# Patient Record
Sex: FEMALE | Race: WHITE | Employment: OTHER | URBAN - METROPOLITAN AREA
[De-identification: names, ages, dates, MRNs, and addresses within clinical notes are randomized per-mention and may not be internally consistent; named-entity substitution may affect disease eponyms.]

---

## 2018-12-21 ENCOUNTER — HOSPITAL ENCOUNTER (OUTPATIENT)
Dept: MAMMOGRAPHY | Age: 63
Discharge: HOME | End: 2018-12-21
Payer: COMMERCIAL

## 2018-12-21 DIAGNOSIS — Z12.39 BREAST CANCER SCREENING: ICD-10-CM

## 2018-12-21 PROCEDURE — 77063 BREAST TOMOSYNTHESIS BI: CPT

## 2021-09-20 ENCOUNTER — HOSPITAL ENCOUNTER (OUTPATIENT)
Dept: MAMMOGRAPHY | Age: 66
Discharge: HOME | End: 2021-09-20
Payer: COMMERCIAL

## 2021-09-20 DIAGNOSIS — Z12.31 BREAST CANCER SCREENING BY MAMMOGRAM: ICD-10-CM

## 2021-09-20 PROCEDURE — 77063 BREAST TOMOSYNTHESIS BI: CPT

## 2021-12-12 ENCOUNTER — APPOINTMENT (OUTPATIENT)
Dept: GENERAL RADIOLOGY | Age: 66
End: 2021-12-12
Payer: COMMERCIAL

## 2021-12-12 ENCOUNTER — HOSPITAL ENCOUNTER (EMERGENCY)
Age: 66
Discharge: HOME | End: 2021-12-12
Attending: STUDENT IN AN ORGANIZED HEALTH CARE EDUCATION/TRAINING PROGRAM
Payer: COMMERCIAL

## 2021-12-12 VITALS
HEIGHT: 59 IN | TEMPERATURE: 98.5 F | HEART RATE: 81 BPM | WEIGHT: 158 LBS | DIASTOLIC BLOOD PRESSURE: 91 MMHG | RESPIRATION RATE: 16 BRPM | BODY MASS INDEX: 31.85 KG/M2 | SYSTOLIC BLOOD PRESSURE: 144 MMHG | OXYGEN SATURATION: 98 %

## 2021-12-12 DIAGNOSIS — S52.502A CLOSED FRACTURE OF DISTAL END OF LEFT RADIUS, UNSPECIFIED FRACTURE MORPHOLOGY, INITIAL ENCOUNTER: Primary | ICD-10-CM

## 2021-12-12 PROCEDURE — 29125 APPL SHORT ARM SPLINT STATIC: CPT

## 2021-12-12 PROCEDURE — 6370000000 HC RX 637 (ALT 250 FOR IP): Performed by: STUDENT IN AN ORGANIZED HEALTH CARE EDUCATION/TRAINING PROGRAM

## 2021-12-12 PROCEDURE — 73110 X-RAY EXAM OF WRIST: CPT

## 2021-12-12 PROCEDURE — 99283 EMERGENCY DEPT VISIT LOW MDM: CPT

## 2021-12-12 RX ORDER — ACETAMINOPHEN 325 MG/1
650 TABLET ORAL ONCE
Status: COMPLETED | OUTPATIENT
Start: 2021-12-12 | End: 2021-12-12

## 2021-12-12 RX ORDER — HYDROCODONE BITARTRATE AND ACETAMINOPHEN 5; 325 MG/1; MG/1
1 TABLET ORAL EVERY 8 HOURS PRN
Qty: 9 TABLET | Refills: 0 | Status: SHIPPED | OUTPATIENT
Start: 2021-12-12 | End: 2021-12-15

## 2021-12-12 RX ADMIN — ACETAMINOPHEN 650 MG: 325 TABLET ORAL at 17:38

## 2021-12-12 ASSESSMENT — PAIN DESCRIPTION - ONSET: ONSET: GRADUAL

## 2021-12-12 ASSESSMENT — PAIN DESCRIPTION - ORIENTATION: ORIENTATION: LEFT

## 2021-12-12 ASSESSMENT — PAIN DESCRIPTION - DESCRIPTORS: DESCRIPTORS: ACHING

## 2021-12-12 ASSESSMENT — PAIN DESCRIPTION - PAIN TYPE: TYPE: ACUTE PAIN

## 2021-12-12 ASSESSMENT — PAIN SCALES - GENERAL: PAINLEVEL_OUTOF10: 4

## 2021-12-12 ASSESSMENT — ENCOUNTER SYMPTOMS
GASTROINTESTINAL NEGATIVE: 1
BACK PAIN: 0

## 2021-12-12 ASSESSMENT — PAIN DESCRIPTION - FREQUENCY: FREQUENCY: CONTINUOUS

## 2021-12-12 ASSESSMENT — PAIN DESCRIPTION - LOCATION: LOCATION: WRIST

## 2021-12-12 NOTE — LETTER
[facility] [place] [facility]  [address] [facility] [address]  Phone: [phone]               December 12, 2021    Patient: Santi Whiteside   YOB: 1955   Date of Visit: 12/12/2021       To Whom It May Concern:    Ulysses Gutta was seen and treated in our emergency department on 12/12/2021. She may return to work on 12/15/2021, or once cleared by orthopedics.        Sincerely,       Raman Garcia RN         Signature:__________________________________

## 2021-12-12 NOTE — ED PROVIDER NOTES
[address] [facility]  EMERGENCY DEPARTMENT ENCOUNTER      Pt Name: Sheridan Monterroso  MRN: [id number]  [place] 1955  Date of evaluation: 12/12/2021  Provider: Anne Dudley DO    CHIEF COMPLAINT       Chief Complaint   Patient presents with    Arm Injury     pt states she fell, has left wrist pain         HISTORY OF PRESENT ILLNESS   (Location/Symptom, Timing/Onset, Context/Setting, Quality, Duration, Modifying Factors, Severity)  Note limiting factors. Sheridan Monterroso is a 61 y.o. female who presents to the emergency department complaining of left wrist pain, swelling. Patient reports that earlier this afternoon she slipped on a deck fell down her left side onto left upper extremity. Complaining of pain with mild swelling over the distal radius. Slight tingling sensation into the volar digits. Reports that she still has intact sensation. Normal range of motion to wrist, elbow, shoulder. Did not hit head did not lose consciousness. Denies other musculoskeletal complaint        Nursing Notes were reviewed. PAST MEDICAL HISTORY     Past Medical History:   Diagnosis Date    Thyroid disease          SURGICAL HISTORY       Past Surgical History:   Procedure Laterality Date    BREAST REDUCTION SURGERY      BREAST SURGERY      CHOLECYSTECTOMY           CURRENT MEDICATIONS       Previous Medications    LEVOTHYROXINE (SYNTHROID) 75 MCG TABLET    Take 75 mcg by mouth Daily. OMEGA-3 FATTY ACIDS (FISH OIL) 1000 MG CPDR    Take 1 capsule by mouth daily.        ALLERGIES     Codeine    FAMILY HISTORY       Family History   Problem Relation Age of Onset    Kidney Disease Mother     Heart Disease Mother         mi    Heart Disease Father 79        Triple bypass          SOCIAL HISTORY       Social History     Socioeconomic History    Marital status:      Spouse name: None    Number of children: None    Years of education: None    Highest education level: None   Occupational History    None   Tobacco Use    Smoking status: Former Smoker     Packs/day: 1.00     Years: 23.00     Pack years: 23.00     Quit date: 10/23/2003     Years since quittin.1    Smokeless tobacco: None   Substance and Sexual Activity    Alcohol use: No    Drug use: No    Sexual activity: None   Other Topics Concern    None   Social History Narrative    None     Social Determinants of Health     Financial Resource Strain:     Difficulty of Paying Living Expenses: Not on file   Food Insecurity:     Worried About Running Out of Food in the Last Year: Not on file    Valery of Food in the Last Year: Not on file   Transportation Needs:     Lack of Transportation (Medical): Not on file    Lack of Transportation (Non-Medical): Not on file   Physical Activity:     Days of Exercise per Week: Not on file    Minutes of Exercise per Session: Not on file   Stress:     Feeling of Stress : Not on file   Social Connections:     Frequency of Communication with Friends and Family: Not on file    Frequency of Social Gatherings with Friends and Family: Not on file    Attends Druze Services: Not on file    Active Member of [address] [place] [address] or Organizations: Not on file    Attends Club or Organization Meetings: Not on file    Marital Status: Not on file   Intimate Partner Violence:     Fear of Current or Ex-Partner: Not on file    Emotionally Abused: Not on file    Physically Abused: Not on file    Sexually Abused: Not on file   Housing Stability:     Unable to Pay for Housing in the Last Year: Not on file    Number of [place] in the Last Year: Not on file    Unstable Housing in the Last Year: Not on file       SCREENINGS                            REVIEW OF SYSTEMS    (2-9 systems for level 4, 10 or more for level 5)   Review of Systems   Constitutional: Negative. HENT: Negative. Cardiovascular: Negative. Gastrointestinal: Negative. Musculoskeletal: Positive for arthralgias and joint swelling. Negative for back pain and neck pain. Skin: Negative for wound. Neurological: Negative for headaches. PHYSICAL EXAM    (up to 7 for level 4, 8 or more for level 5)   RECENT VITALS:     Temp: 98.5 °F (36.9 °C),  Pulse: 98, Resp: 14, BP: (!) 169/98, SpO2: 99 %    Physical Exam  HENT:      Head: Normocephalic and atraumatic. Eyes:      Pupils: Pupils are equal, round, and reactive to light. Neck:      Comments: No midline pain no step-offs no deformities  Pulmonary:      Effort: Pulmonary effort is normal. No respiratory distress. Musculoskeletal:      Cervical back: Normal range of motion and neck supple. Comments: Left upper extremity no tenderness to palpation at shoulder or elbow. Patient has a tenderness over the distal radius with mild swelling. Normal range of motion the wrist.  Intact sensation distally. Cap refill less than 2 seconds. Neurological:      Mental Status: She is alert. DIAGNOSTIC RESULTS     EKG: All EKG's are interpreted by the Emergency Department Physician who either signs or Co-signs this chart in the absence of a cardiologist.          RADIOLOGY:   Non-plain film images such as CT, Ultrasound and MRI are read by the radiologist. Plain radiographic images are visualized and preliminarily interpreted by the emergency physician. Interpretation per the Radiologist below, if available at the time of this note:    XR WRIST LEFT (MIN 3 VIEWS)   Final Result   Acute minimally displaced and impacted distal radial metaphyseal fracture. Soft tissue swelling. LABS:  Labs Reviewed - No data to display    All other labs were within normal range or not returned as of this dictation. EMERGENCY DEPARTMENT COURSE and DIFFERENTIAL DIAGNOSIS/MDM:   Carlitos Lopez is a 61 y.o. female who presents to the emergency department with the complaint of left wrist pain, swelling after slip and fall injury fell down on the left side.   No other injuries aside from distal forearm/wrist pain. Reports intact sensation, cap refill less than 2 seconds. Concern for distal radial fracture. Obtain plain film imaging of left wrist to further evaluate the osseous. At this time pain is well controlled for patient. No other injuries noted on physical exam.    Distal radius fracture, minimally displaced, placed in sugar tong splint, outpatient orthopedic surgery follow-up. CRITICAL CARE TIME   Total Critical Care time was 0 minutes, excluding separately reportable procedures. There was a high probability of clinically significant/life threatening deterioration in the patient's condition which required my urgent intervention. Clinical concern   Intervention     CONSULTS:  None    PROCEDURES:  Unless otherwise noted below, none     Splint Application    Date/Time: 12/12/2021 4:19 PM  Performed by: Anne Dudley DO  Authorized by: Anne Dudley DO     Consent:     Consent obtained:  Verbal    Consent given by:  Patient    Risks discussed:  Discoloration and numbness    Alternatives discussed:  No treatment  Pre-procedure details:     Sensation:  Normal    Skin color:  Pink  Procedure details:     Laterality:  Left    Location:  Arm    Splint type:  Sugar tong    Supplies:  Ortho-Glass  Post-procedure details:     Pain:  Unchanged    Sensation:  Normal    Skin color:  Pink    Patient tolerance of procedure: Tolerated well, no immediate complications            FINAL IMPRESSION      1.  Closed fracture of distal end of left radius, unspecified fracture morphology, initial encounter          DISPOSITION/PLAN   DISPOSITION Discharge - Pending Orders Complete 12/12/2021 04:17:31 PM      PATIENT REFERRED TO:  Mari Mai MD  [address] [facility] [address] [facility] [address]  [phone]    Schedule an appointment as soon as possible for a visit in 1 day      [facility]  [address] [facility]  [phone]    If symptoms worsen      DISCHARGE MEDICATIONS:  New Prescriptions    No medications on file     Controlled Substances Monitoring:     No flowsheet data found.     (Please note that portions of this note were completed with a voice recognition program.  Efforts were made to edit the dictations but occasionally words are mis-transcribed.)    Bushra Catherine DO (electronically signed)  Attending Emergency Physician            Bushra Catherine DO  12/12/21 [id number]

## 2021-12-12 NOTE — ED NOTES
Splint applied to LUE by TANIA Singh DO. Sling also applied. Discharge instructions given, pt verbalized understanding. Discussed follow-up appointments and medications. No questions or concerns at this time. Pt ambulated independently out of ER. Pt discharged with 1 prescription.       Bennie Cotto RN  12/12/21 [id number]

## 2021-12-14 ENCOUNTER — OFFICE VISIT (OUTPATIENT)
Dept: ORTHOPEDIC SURGERY | Age: 66
End: 2021-12-14
Payer: COMMERCIAL

## 2021-12-14 VITALS — WEIGHT: 158 LBS | HEIGHT: 59 IN | BODY MASS INDEX: 31.85 KG/M2

## 2021-12-14 DIAGNOSIS — M25.532 LEFT WRIST PAIN: ICD-10-CM

## 2021-12-14 DIAGNOSIS — S52.532A CLOSED COLLES' FRACTURE OF LEFT RADIUS, INITIAL ENCOUNTER: Primary | ICD-10-CM

## 2021-12-14 PROCEDURE — 99203 OFFICE O/P NEW LOW 30 MIN: CPT | Performed by: ORTHOPAEDIC SURGERY

## 2021-12-14 NOTE — PROGRESS NOTES
ORTHOPAEDIC SURGERY INITIAL EVALUATION NOTE  Chief Complaint   Patient presents with    New Patient     12/12 fell through a rotted board on her porch       HISTORY OF PRESENT ILLNESS:  79-year-old right-hand-dominant female presents for evaluation of a left wrist injury. She sustained an injury when she fell through a rotten board on her porch. She fell onto an outstretched left hand. Her date of injury was 12/12/2021. This is been 2 days. Her pain level is 4 out of 10. Its localized to the wrist. It does not radiate. She denies dysesthesias. She denies any problems with her wrist in the past. She is a manual . Her pain is mostly about the dorsal aspect of the wrist. She has noticed some swelling as well as bruising. She presented the emergency department where x-rays demonstrated a distal radius fracture. She was secured into a well-padded sugar tong splint. Past Medical History:   Diagnosis Date    Thyroid disease        Current Outpatient Medications   Medication Sig Dispense Refill    HYDROcodone-acetaminophen (NORCO) 5-325 MG per tablet Take 1 tablet by mouth every 8 hours as needed for Pain for up to 3 days. Intended supply: 3 days. Take lowest dose possible to manage pain 9 tablet 0    levothyroxine (SYNTHROID) 75 MCG tablet Take 75 mcg by mouth Daily.  Omega-3 Fatty Acids (FISH OIL) 1000 MG CPDR Take 1 capsule by mouth daily. No current facility-administered medications for this visit. Past Surgical History:   Procedure Laterality Date    BREAST REDUCTION SURGERY      BREAST SURGERY      CHOLECYSTECTOMY         Allergies   Allergen Reactions    Codeine      \"doesn't like the way it makes me feel. \"       Family History   Problem Relation Age of Onset    Kidney Disease Mother     Heart Disease Mother         mi    Heart Disease Father 79        Triple bypass       Social History     Socioeconomic History    Marital status:      Spouse name: Not on file    Number of children: Not on file    Years of education: Not on file    Highest education level: Not on file   Occupational History    Not on file   Tobacco Use    Smoking status: Former Smoker     Packs/day: 1.00     Years: 23.00     Pack years: 23.00     Quit date: 10/23/2003     Years since quittin.1    Smokeless tobacco: Never Used   Substance and Sexual Activity    Alcohol use: No    Drug use: No    Sexual activity: Not on file   Other Topics Concern    Not on file   Social History Narrative    Not on file     Social Determinants of Health     Financial Resource Strain:     Difficulty of Paying Living Expenses: Not on file   Food Insecurity:     Worried About Running Out of Food in the Last Year: Not on file    Valery of Food in the Last Year: Not on file   Transportation Needs:     Lack of Transportation (Medical): Not on file    Lack of Transportation (Non-Medical): Not on file   Physical Activity:     Days of Exercise per Week: Not on file    Minutes of Exercise per Session: Not on file   Stress:     Feeling of Stress : Not on file   Social Connections:     Frequency of Communication with Friends and Family: Not on file    Frequency of Social Gatherings with Friends and Family: Not on file    Attends Amish Services: Not on file    Active Member of [address] [place] [address] [employer] or Organizations: Not on file    Attends Club or Organization Meetings: Not on file    Marital Status: Not on file   Intimate Partner Violence:     Fear of Current or Ex-Partner: Not on file    Emotionally Abused: Not on file    Physically Abused: Not on file    Sexually Abused: Not on file   Housing Stability:     Unable to Pay for Housing in the Last Year: Not on file    Number of [place] in the Last Year: Not on file    Unstable Housing in the Last Year: Not on file     Review of systems: Please see intake form for today's date for complete review of systems.     PHYSICAL EXAM  Gen: awake, alert, oriented  HEENT: atraumatic, normocephalic  Neck: supple  Resp: equal chest rise bilaterally, no audible wheezes, no accessory muscle use. CV: Lower extremities warm and well perfused. Abd: soft, nontender   Focused examination of the left upper extremity:  Patient presents in a well-padded sugar tong splint. This was removed in order to examine the skin. There are no cuts, open wounds, or abrasions. There is no obvious deformity. There is mild swelling in the area. There is ecchymosis about the volar wrist. There is tenderness palpation about the dorsal wrist. Wrist range of motion was not assessed today. Finger movements are intact. There is a palpable radial pulse and brisk capillary refill to the fingers. Compartments are soft and compressible. RADIOGRAPHIC EXAM:  3 views of the left wrist including PA, oblique, and lateral projections demonstrate distal radius fracture involving primarily the dorsal cortex. There is 5 degrees dorsal angulation as a result. There is chronic deformity involving the distal radius. There is 4 mm of ulnar positive variance. There is sclerosis about the distal ulna and a matching sclerotic area in the lunate. There is a significant increase in radial inclination consistent with possible Madelung deformity. Overall alignment is unchanged from x-rays in the emergency department. ASSESSEMENT AND PLAN    61 y.o. female with the following orthopaedic surgery problems:    1. Left distal radius fracture    I reviewed the x-rays in detail with the patient. 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 She denies significant left wrist problems in the past. My inclination would be to manage this nonoperatively, however would like a CT scan to evaluate the fracture pattern and its displacement. I did place her into a well-padded short arm cast today. The stockinette was applied and then secondarily the web roll with the wrist in neutral position.  This was overwrapped with fiberglass casting material once again with the wrist in neutral position. She tolerated this well. The cast was molded to her hand. She was given generalized cast care. She will use ice and elevation to limit swelling. She will remain nonweightbearing to her left upper extremity. We will call to  her regarding the results of the CT scan. Likely, she will need 4 weeks of cast immobilization and then transition to a removable brace.     Crystal Briscoe MD

## 2021-12-15 ENCOUNTER — TELEPHONE (OUTPATIENT)
Dept: ORTHOPEDIC SURGERY | Age: 66
End: 2021-12-15

## 2021-12-17 ENCOUNTER — TELEPHONE (OUTPATIENT)
Dept: ORTHOPEDIC SURGERY | Age: 66
End: 2021-12-17

## 2021-12-29 ENCOUNTER — HOSPITAL ENCOUNTER (OUTPATIENT)
Dept: CT IMAGING | Age: 66
Discharge: HOME | End: 2021-12-29
Payer: COMMERCIAL

## 2021-12-29 DIAGNOSIS — M25.532 LEFT WRIST PAIN: ICD-10-CM

## 2021-12-29 PROCEDURE — 73200 CT UPPER EXTREMITY W/O DYE: CPT

## 2022-01-12 ENCOUNTER — OFFICE VISIT (OUTPATIENT)
Dept: ORTHOPEDIC SURGERY | Age: 67
End: 2022-01-12
Payer: COMMERCIAL

## 2022-01-12 ENCOUNTER — TELEPHONE (OUTPATIENT)
Dept: ORTHOPEDIC SURGERY | Age: 67
End: 2022-01-12

## 2022-01-12 VITALS — HEIGHT: 59 IN | BODY MASS INDEX: 31.85 KG/M2 | WEIGHT: 158 LBS

## 2022-01-12 DIAGNOSIS — R52 PAIN: ICD-10-CM

## 2022-01-12 DIAGNOSIS — S52.532D CLOSED COLLES' FRACTURE OF LEFT RADIUS WITH ROUTINE HEALING, SUBSEQUENT ENCOUNTER: Primary | ICD-10-CM

## 2022-01-12 PROCEDURE — L3908 WHO COCK-UP NONMOLDE PRE OTS: HCPCS | Performed by: ORTHOPAEDIC SURGERY

## 2022-01-12 PROCEDURE — 99213 OFFICE O/P EST LOW 20 MIN: CPT | Performed by: ORTHOPAEDIC SURGERY

## 2022-01-12 NOTE — PROGRESS NOTES
ORTHOPAEDIC SURGERY FOLLOWUP VISIT    CHIEF COMPLAINT:  Left wrist injury    DATE OF INJURY: 12/12/2021  DIAGNOSIS: Left distal radius fracture  DATE OF SURGERY: N/A    HISTORY OF PRESENT ILLNESS:  57-year-old right-hand-dominant female presents for repeat evaluation of left wrist fracture. It has now been 1 month since her injury. Her x-rays demonstrated a fracture of the distal radius, extra-articular with slight dorsal angulation and minor shortening. She was noted to have significant ulnar positive variance with ulnar impaction syndrome pre-existing to her fracture. CT scan was obtained to evaluate the anatomy and degree of angulation/shortening. She opted for nonoperative treatment. It has been 4 weeks since her injury. Overall she has noticed some intermittent numbness in her palm which seems to be positional.  She has not had any wounds or skin issues as a result of cast immobilization. She states that her pain is a 4 to a 6 out of 10 on a day-to-day basis. PHYSICAL EXAM:  General: Well-appearing female stated age. No acute distress. Left upper extremity: The patient presents in a well-padded short arm cast.  This was removed in order to examine the skin. There are no cuts, open wounds, or abrasions. There is resolving ecchymosis over the ulnar aspect of the forearm. There is no significant ecchymosis about the wrist.  There is moderate swelling primarily about the dorsal hand and distal radius. Wrist range of motion demonstrates soreness with any movement. She has 40 degrees supination, 90 degrees pronation. This is within the limits of pain. She does have tenderness to palpation over the distal ulna. She has minimal movement to radial/ulnar deviation. She has pain-free movement of the elbow. Sensation is intact to light touch in median, radial, ulnar, and axillary distributions. Motor function is intact to AIN, PIN, ulnar motor nerves.   There is a strong palpable radial pulse, and brisk capillary refill to the fingers. Compartments are soft and compressible    RADIOGRAPHIC EXAM:  CT scan of the left wrist was reviewed. This demonstrates fracture involving the distal radius. There is minor widening of the scapholunate interval, which is felt to be chronic in nature. There is ulnar positive variance, which is felt in part to be pre-existing. There is sclerosis at the distal ulna and the proximal pole of the lunate likely related to longstanding ulnar impaction syndrome. There is approximately 4 mm of impaction. There is minor apex volar angulation. 3 views of the left wrist obtained in clinic today demonstrate significant increase in distal radius inclination angle. The articulation between the distal ulna and distal radius appears to be congruent. There is sclerosis at the ulnar head and matching sclerotic area in the lunate consistent with longstanding ulnar abutment. There is approximately 10 degrees of dorsal angulation. There is evidence of ongoing healing within the distal radius metaphysis and consolidation of the dorsal comminution. ASSESSMENT AND PLAN:  1. Left distal radius fracture  2. Left wrist ulnar impaction syndrome  3. 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 She does appear to have appropriate healing in a reasonable position given her anatomic abnormality. I recommended that she continue to proceed with nonoperative treatment. She was transition to a removable wrist brace today. She will remain nonweightbearing to the left upper extremity. She can come out of the brace for wrist range of motion exercises as tolerated. She was given a referral to occupational therapy to initiate range of motion exercises. At this time, she will remain off work due to the nature of her job. She will return in 4 weeks for repeat x-rays and clinical assessment.   I did review her x-rays that potentially if she had significant ulnar-sided wrist pain after her radius was healed, she could undergo an ulnar shortening osteotomy or Darrach as a salvage procedure. She did not have significant ulnar-sided wrist pain prior to her injury, which may indicate that she will tolerate nonsurgical treatment well. We will see her back in 4 weeks.       Sunita Baker MD

## 2022-01-12 NOTE — TELEPHONE ENCOUNTER
Faxed 1/12/2022 office note to Jemima Bashir with [address] @ [phone]    Claim # [id number]

## 2022-01-13 ENCOUNTER — TELEPHONE (OUTPATIENT)
Dept: ORTHOPEDIC SURGERY | Age: 67
End: 2022-01-13

## 2022-01-18 ENCOUNTER — HOSPITAL ENCOUNTER (OUTPATIENT)
Dept: OCCUPATIONAL THERAPY | Age: 67
Setting detail: THERAPIES SERIES
Discharge: HOME | End: 2022-01-18
Payer: COMMERCIAL

## 2022-01-18 PROCEDURE — 97112 NEUROMUSCULAR REEDUCATION: CPT | Performed by: OCCUPATIONAL THERAPIST

## 2022-01-18 PROCEDURE — 97165 OT EVAL LOW COMPLEX 30 MIN: CPT | Performed by: OCCUPATIONAL THERAPIST

## 2022-01-18 NOTE — PLAN OF CARE
[address] [place], [address] [place] [address] [address]  Phone: [phone] Fax: [phone]     Occupational Therapy/Hand Therapy Certification      Dear  Dr Marcell Fernandez    We had the pleasure of evaluating the following patient for occupational therapy services at [address] [place] [address]. A summary of our findings can be found in the initial assessment below. This includes our plan of care. If you have any questions or concerns regarding these findings, please do not hesitate to contact me at the office phone number checked above. Thank you for the referral.     Physician Signature:_______________________________Date:__________________  By signing above (or electronic signature), therapists plan is approved by physician      Patient: Sara Olson   :    MRN: [id number]  Referring Physician:        Evaluation Date: 2022      Medical Diagnosis Information: A51.750I (ICD-10-CM) - Closed Colles' fracture of left radius     Treatment Diagnosis: L wrist pain M25.532           Date of Injury: 23  Date of Surgery: N/A                                  Insurance information:  Aetna    Date of Patient follow up with Physician: 4 wks    [x] Patient reported history, allergies, and medications reviewed - see intake form. Preferred Language for Healthcare:   [x]English       []other:       RESTRICTIONS/PRECAUTIONS: 5# wt limit    Latex Allergy:  [x]No      []Yes  Pacemaker:  [x] No       [] Yes       Functional Scale: 66% (Quick DASH)   Date assessed:  2022      C-SSRS Triggered by Intake questionnaire (Past 2 wk assessment):    No, Questionnaire did not trigger screening.       SUBJECTIVE: Patient reported deficits/history of current problem: Pt fell as she got her foot caught on broken area of her deck    Pain Scale: 5-8/10  [x]Constant      []Intermittent    []other:  Pain Location:  Ulnar and radial wrist  Easing factors: rest  Provocative factors: AROM     OBJECTIVE:   Date:  Hand Dominance:     [x]  Right    [] Left 1/18/2022     Objective Measures:    PAIN 5-8/10   Quick DASH 66%   Digit  ROM         Index     MP:65  PIP:36  DIP:20                              Long MP:60  PIP:55  DIP:31                              Ring MP:60  PIP:55  DIP:34                              Small MP:65  PIP:40  DIP:40   Thumb ROM MP 50  IP 5   Thumb opposition  R: 2,3,4  L:   Thumb Radial/Palmar abd ROM R:  L:   Wrist ROM Ext/Flex R:50/28  L:   Rad/Uln dev ROM R:  L:   Forearm ROM  Sup/pron R:30/90  L:   Elbow ROM Ext/flex R:WFL  L:   Shoulder Flex  Shoulder Abd  Shoulder IR/ER   WFL   Edema in cm circumf. MCPJs R:  L:   Edema in cm circumf.   Wrist R:15.9  L:18.2    strength in lbs R:  L:   Pinch Strengthin lbs: lat  R:  L:   Pinch Strength in lbs:  3 point R:  L:     MMT:     Observations:  (including splints, bandages, incisions, scars):      Sensation:  [] No reported deficits  [] Intact to light touch    [] [place] Jitendra test completed, findings as noted:  [x] Other: numbness at thumb, palm    Palpation: NT    Occupational Profile:  Home Enviroment: lives with  [] spouse,  [x] family,  [] alone,  [] significant other,   [] other:    Occupation/School: assembly work    Recreational Activities/Meaningful Interests: walking    Prior Level of Function: [x] Independent with ADLs/IADLs     [] Assistance needed (describe):    Patient-Identified Primary Performance Deficits (to be addressed in POC):   [x] bathing    [x] household tasks (cooking/cleaning)   [x] dressing    [] self feeding   [x] grooming    [x] work/education   [] functional mobility   [] sleeping/rest   [] toileting/hygiene   [] recreational activities   [x] driving    [] community/social participation   [] other:     Comorbidities Affecting Functional Performance:     [x]Anxiety (F41.9)/Depression (F32.9)   []Diabetes Type 1(E10.65) or 2 (E11.65)   []Rheumatoid Arthritis (M05.9)  []Fibromyalgia (M79.7)  []Neuropathy(G60.9)  []Osteoarthritis(M19.91)  []None   []Other:    Functional Mobility/Transfers/Gait:  [x] Independent - no significant gait deviations  [] Assistance needed   [] Assistive device used: Falls Risk Assessment (30 days):   [x] Falls Risk assessed and no intervention required. [] Falls Risk assessed and Patient requires intervention due to being higher risk   TUG score (>12s at risk):     [] Falls education provided, including      Review Of Systems (ROS): [x]Performed Review of systems (Integumentary, CardioPulmonary, Neurological) by intake and observation. Intake form has been scanned into medical record. Patient has been instructed to contact their primary care physician regarding ROS issues if not already being addressed at this time. ASSESSMENT:   This patient presents with signs and symptoms consistent with the medical diagnosis provided by the referring physician. Impairments (physical, cognitive and/or psychosocial):  [x] Decreased mobility   [x] Weakness    [] Hypersensitivity   [x] Pain/tenderness   [x] Edema/swelling   [x] Decreased coordination (fine/gross motor)   [] Impaired body mechanics  [] Sensory loss  [] Loss of balance   [] Other:      Performance Deficits (to be addressed in plan of care):   [x] Bathing    [x] Household Tasks (cooking/cleaning)   [x] Dressing    [] Self Feeding   [x] Grooming    [x] Work/Education   [] Functional Mobility   [x] Sleeping/Rest   [] Toileting/Hygiene   [] Recreational Activities   [x] Driving    [] Community/Social Participation   [] Other:     Rehab Potential:   [] Excellent [] Good [] Fair  [] Poor     Barriers affecting rehab potential:  []Age    []Lack of Motivation   [x]Co-Morbidities  []Cognitive Function  []Environmental/home/work barriers  []Other:     Tolerance of evaluation/treatment:    [] Excellent [x] Good [] Fair  [] Poor    PLAN OF CARE:  Interventions: [x] Therapeutic Exercise [x] Therapeutic Activity    [x] Activities of Daily Living [x] Neuromuscular Re-education      [x] Patient Education  [x] Manual Therapy      [x] Modalities as needed, and not otherwise contraindicated, including: ultrasound,paraffin,moist heat/cold pack, electrical stimulation, contrast bath, iontophoresis  [] Splinting    Frequency/Duration:  2 days per week for 6-8 weeks      GOALS:  Patient stated goal: to close hand and use as before    [] Progressing: [] Met: [] Not Met: [] Adjusted    Therapist goals for Patient:   Short Term Goals: To be achieved in: 2 weeks  1. Independent in HEP and progression per patient tolerance, in order to prevent re-injury. [] Progressing: [] Met: [] Not Met: [] Adjusted   2. Patient will have a decrease in pain to facilitate improvement in movement, function, and ADLs as indicated by Functional Deficits. [] Progressing: [] Met: [] Not Met: [] Adjusted    Long Term Goals to be achieved in 6 weeks (through 3/18/22), including patient directed goals to address patient identified performance deficits:  1) Pt to be independent in graded HEP progression with a good level of effort and compliance. [] Progressing: [] Met: [] Not Met: [] Adjusted   2) Pt to report a score of </= 25 % on the Quick DASH disability questionnaire for increased performance with carrying, moving, and handling objects. [] Progressing: [] Met: [] Not Met: [] Adjusted   3) Pt will demonstrate increased ROM to [facility] for improved independence with self care, home mgt tasks, driving, vocational tasks and sleeping.   [] Progressing: [] Met: [] Not Met: [] Adjusted   4) Pt will demonstrate increased  strength to at least 50% of uninjured hand for improved independence with self care, home mgt tasks, driving, and vocational tasks  [] Progressing: [] Met: [] Not Met: [] Adjusted   5) Pt will have a decrease in pain to 2/10 to facilitate independence with self care, home mgt tasks, driving, vocational tasks and sleeping. [] Progressing: [] Met: [] Not Met: [] Adjusted          OCCUPATIONAL THERAPY EVALUATION COMPLEXITY JUSTIFICATION:    [x] An occupational profile and medical/therapy history, which includes:   [x] a brief history including medical and/or therapy records relating to the     presenting problem   [] an expanded review of medical and/or therapy records and additional review     of physical, cognitive or psychosocial history related to current functional    performance   [] an extensive additional review of review of medical and/or therapy records   and physical, cognitive, or psychosocial history related to current    functional performance    [x] An assessment that identifies performance deficits (relating to physical, cognitive, or psychosocial skills) that result in activity limitations and/or participation restrictions:   [x] 1-3 performance deficits   [] 3-5 performance deficits   [] 5 or more performance deficits    [x] Clinical decision making of:   [x] low complexity, including analysis of occupational profile, data analysis from problem focused assessment, and consideration of a limited number of treatment options. No comorbidities affect occupational performance. No task modifications or assistance needed to complete evaluation. [] moderate complexity, including analysis of occupational profile, data analysis from detailed assessment and consideration of several treatment options. Comorbidities that affect occupational performance may be present. Minimal to moderate task modifications or assistance needed to complete assessment. [] high complexity, including analysis of occupational profile, analysis of data from comprehensive assessment and consideration of multiple treatment options. Multiple comorbidities present that affect occupational performance. Significant task modifications or assistance needed to complete assessment.     Evaluation Code:  [x] Low

## 2022-01-18 NOTE — FLOWSHEET NOTE
[address] [place], [address] [place] [address] [address]  Phone: [phone] Fax: [phone]     Occupational Therapy Treatment Note/ Progress Report:     Is this a Progress Report:     []  Yes  [x]  No      If Yes:  Date Range for reporting period:  Beginning 22    Ending    Progress report will be due (10 Rx or 30 days whichever is less):     Recertification will be due (POC Duration  / 90 days whichever is less): 22   Patient: Rakesh Aguilera                          :                                   MRN: [id number]  Referring Physician:                                                   Evaluation Date: 2022                                         Medical Diagnosis Information: S52.532D (ICD-10-CM) - Closed Colles' fracture of left radius         Treatment Diagnosis: L wrist pain M25.532           Date of Injury: 23  Date of Surgery: N/A                                  Insurance information:  Aetna     Date of Patient follow up with Physician: 4 wksHas the plan of care been signed (Y/N):        []  Yes  [x]  No       Visit # Insurance Allowable Auth Required   1 60 []  Yes [x]  No    From 22  to 2022      Preferred Language for Healthcare:   [x]English       []other:     RESTRICTIONS/PRECAUTIONS: 5# wt limit     Latex Allergy:  [x]? No      []? Yes                    Pacemaker:  [x]? No       []? Yes         Functional Scale: 66% (Quick DASH)                                 Date assessed:  2022        C-SSRS Triggered by Intake questionnaire (Past 2 wk assessment):    No, Questionnaire did not trigger screening.      SUBJECTIVE: Patient reported deficits/history of current problem: Pt fell as she got her foot caught on broken area of her deck     Pain Scale: 5-8/10       [x]? Constant                []?Intermittent              []?other:  Pain Location:  Ulnar and radial proprioception  to assist with  scapular, scapulothoracic and UE control with self care, reaching, carrying, lifting, house/yardwork, driving/computer work.     Therapeutic Activities & NMR:    [] (13494 or 52710) Provided verbal/tactile cueing for activities related to improving balance, coordination, kinesthetic sense, posture, motor skill, proprioception and motor activation to allow for proper function of scapular, scapulothoracic and UE control with self care, carrying, lifting, driving/computer work    Home Exercise Program:    [] (43572) Reviewed/Progressed HEP activities related to strengthening, flexibility, endurance, ROM of scapular, scapulothoracic and UE control with self care, reaching, carrying, lifting, house/yardwork, driving/computer work  [] (52983) Reviewed/Progressed HEP activities related to improving balance, coordination, kinesthetic sense, posture, motor skill, proprioception of scapular, scapulothoracic and UE control with self care, reaching, carrying, lifting, house/yardwork, driving/computer work      Manual Treatments:  PROM / STM / Oscillations-Mobs:  G-I, II, III, IV (PA's, Inf., Post.)  [] (86402) Provided manual therapy to mobilize soft tissue/joints of cervical/CT, scapular GHJ and UE for the purpose of modulating pain, promoting relaxation,  increasing ROM, reducing/eliminating soft tissue swelling/inflammation/restriction, improving soft tissue extensibility and allowing for proper ROM for normal function with self care, reaching, carrying, lifting, house/yardwork, driving/computer work    ADL Training:  [] (31045) Provided self-care/home management training related to activities of daily living and compensatory training, and/or use of adaptive equipment      Charges:  Timed Code Treatment Minutes: 15'   Total Treatment Minutes: 39'   Worker's Comp: Time In/Time Out     [x] EVAL (LOW) 37769 (typically 20 minutes face-to-face)    [] EVAL (MOD) 56611 (typically 30 minutes face-to-face)  [] EVAL (HIGH) 06327 (typically 45 minutes face-to-face)  [] OT Re-eval (36857)       [] Adrien [phone]) x      [] MKXVG(67817)  [x] NMR (43489) x  1    [] Estim (attended) (39673)   [] Manual ([id number]) x      [] US (97509)  [] TA (04416) x      [] Paraffin (29119)  [] ADL  (72022) x     [] Splint/L code:    [] Estim (unattended) (22 501879)  [] Fluidotherapy (10481)  [] Other:    Comorbidities Affecting Functional Performance:     []Anxiety (F41.9)/Depression (F32.9)   []Diabetes Type 1(E10.65) or 2 (E11.65)   []Rheumatoid Arthritis (M05.9)  []Fibromyalgia (M79.7)  []Neuropathy(G60.9)  []Osteoarthritis(M19.91)  []None   []Other:    ASSESSMENT:      GOALS:  Patient stated goal: to close hand and use as before    []? Progressing: []? Met: []? Not Met: []? Adjusted     Therapist goals for Patient:   Short Term Goals: To be achieved in: 2 weeks  1. Independent in HEP and progression per patient tolerance, in order to prevent re-injury. []? Progressing: []? Met: []? Not Met: []? Adjusted   2. Patient will have a decrease in pain to facilitate improvement in movement, function, and ADLs as indicated by Functional Deficits. []? Progressing: []? Met: []? Not Met: []? Adjusted     Long Term Goals to be achieved in 6 weeks (through 3/18/22), including patient directed goals to address patient identified performance deficits:  1) Pt to be independent in graded HEP progression with a good level of effort and compliance. []? Progressing: []? Met: []? Not Met: []? Adjusted   2) Pt to report a score of </= 25 % on the Quick DASH disability questionnaire for increased performance with carrying, moving, and handling objects. []? Progressing: []? Met: []? Not Met: []? Adjusted   3) Pt will demonstrate increased ROM to [facility] for improved independence with self care, home mgt tasks, driving, vocational tasks and sleeping.  []? Progressing: []? Met: []? Not Met: []?  Adjusted   4) Pt will demonstrate increased  strength to at least 50% of uninjured hand for improved independence with self care, home mgt tasks, driving, and vocational tasks  []? Progressing: []? Met: []? Not Met: []? Adjusted   5) Pt will have a decrease in pain to 2/10 to facilitate independence with self care, home mgt tasks, driving, vocational tasks and sleeping.  []? Progressing: []? Met: []? Not Met: []? Adjusted    Overall Progression Towards Functional Goals/Treatment Progress Update:  [] Patient is progressing as expected towards functional goals listed. [] Progression is slowed due to complexities/impairments listed. [] Progression has been slowed due to co-morbidities. [x] Plan just implemented, too soon to assess goals progression <30 days  [] Goals require adjustment due to lack of progress  [] Patient is not progressing as expected and requires additional follow up with physician  [] All goals are met  [] Other:     Prognosis for POC: [x] Good [] Fair  [] Poor    Patient requires continued skilled intervention: [x] Yes  [] No    Treatment/Activity Tolerance:  [x] Patient able to complete treatment  [] Patient limited by fatigue  [] Patient limited by pain    [] Patient limited by other medical complications  [] Other:                  PLAN: See eval  [] Continue per plan of care [] Alter current plan (see comments above)  [x] Plan of care initiated [] Hold pending MD visit [] Discharge      Electronically signed by:  Sebastián LEON/L [id number]    Note: If patient does not return for scheduled/ recommended follow up visits, this note will serve as a discharge from care along with most recent update on progress.   Phone: [phone]  Fax [phone]

## 2022-01-25 ENCOUNTER — HOSPITAL ENCOUNTER (OUTPATIENT)
Dept: OCCUPATIONAL THERAPY | Age: 67
Setting detail: THERAPIES SERIES
Discharge: HOME | End: 2022-01-25
Payer: COMMERCIAL

## 2022-01-25 NOTE — FLOWSHEET NOTE
[facility], [address], [address] [place] [address]    Occupational Therapy  Cancellation/No-show Note  Patient Name:  Armen Mendoza   :     Date:  2022  Cancelled visits to date: 1  No-shows to date: 0    For today's appointment patient:  [x]    Cancelled  []    Rescheduled appointment  []    No-show     Reason given by patient:  []    Patient ill  []    Conflicting appointment  []    No transportation    []    Conflict with work  []    No reason given  [x]    Other:  Covid exposure   Comments:      Electronically signed by:  Moreno Longoria, OTR/L [id number]

## 2022-01-31 ENCOUNTER — HOSPITAL ENCOUNTER (OUTPATIENT)
Dept: OCCUPATIONAL THERAPY | Age: 67
Setting detail: THERAPIES SERIES
Discharge: HOME | End: 2022-01-31
Payer: COMMERCIAL

## 2022-01-31 PROCEDURE — 97110 THERAPEUTIC EXERCISES: CPT | Performed by: OCCUPATIONAL THERAPIST

## 2022-01-31 PROCEDURE — 97022 WHIRLPOOL THERAPY: CPT | Performed by: OCCUPATIONAL THERAPIST

## 2022-01-31 PROCEDURE — 97112 NEUROMUSCULAR REEDUCATION: CPT | Performed by: OCCUPATIONAL THERAPIST

## 2022-01-31 PROCEDURE — 97140 MANUAL THERAPY 1/> REGIONS: CPT | Performed by: OCCUPATIONAL THERAPIST

## 2022-01-31 NOTE — FLOWSHEET NOTE
[address] [place], [address] [address]  Phone: [phone] Fax: [phone]     Occupational Therapy Treatment Note/ Progress Report:     Is this a Progress Report:     []  Yes  [x]  No      If Yes:  Date Range for reporting period:  Beginning 22    Ending    Progress report will be due (10 Rx or 30 days whichever is less):     Recertification will be due (POC Duration  / 90 days whichever is less): 22   Patient: Guero Young                          : [birth year]                                  MRN: [id number]  Referring Physician:                                                   Evaluation Date: 2022                                         Medical Diagnosis Information: S52.532D (ICD-10-CM) - Closed Colles' fracture of left radius         Treatment Diagnosis: L wrist pain M25.532           Date of Injury: 23  Date of Surgery: N/A                                  Insurance information:  Aetna     Date of Patient follow up with Physician: 4 wksHas the plan of care been signed (Y/N):        []  Yes  [x]  No       Visit # Insurance Allowable Auth Required   2 60 []  Yes [x]  No    From 22  to 2022      Preferred Language for Healthcare:   [x]English       []other:     RESTRICTIONS/PRECAUTIONS: 5# wt limit     Latex Allergy:  [x]? No      []? Yes                    Pacemaker:  [x]? No       []? Yes         Functional Scale: 66% (Quick DASH)                                 Date assessed:  2022        C-SSRS Triggered by Intake questionnaire (Past 2 wk assessment):    No, Questionnaire did not trigger screening.      SUBJECTIVE:   Patient reported deficits/history of current problem: Pt fell as she got her foot caught on broken area of her deck     Pain Scale: 5-8/10       [x]? Constant                []?Intermittent              []?other:  Pain Location:  Ulnar and radial wrist  Easing factors: rest  Provocative factors: AROM      OBJECTIVE:   Date:  Hand Dominance:     [x]? Right    []? Left 1/18/2022 1/31/22   Objective Measures:      PAIN 5-8/10 4/10   Quick DASH 66%    Digit  ROM         Index       MP:65  PIP:36  DIP:20                               Long MP:60  PIP:55  DIP:31                               Ring MP:60  PIP:55  DIP:34                               Small MP:65  PIP:40  DIP:40    Thumb ROM MP 50  IP 5    Thumb opposition  R: 2,3,4  L: 2,3,4,5   Thumb Radial/Palmar abd ROM R:  L:    Wrist ROM Ext/Flex R:50/28  L: 55/40   Rad/Uln dev ROM R:  L:    Forearm ROM  Sup/pron R:30/90  L: 55/90   Elbow ROM Ext/flex R:WFL  L:    Shoulder Flex  Shoulder Abd  Shoulder IR/ER    WFL    Edema in cm circumf. MCPJs R:  L:    Edema in cm circumf. Wrist R:15.9  L:18.2     strength in lbs R:  L:    Pinch Strengthin lbs: lat  R:  L:    Pinch Strength in lbs:  3 point R:  L:       MMT:       Observations:  (including splints, bandages, incisions, scars):              MODALITIES: 1/18/22 1/31/22    Fluidotherapy (41430)  12'    Estim (10845/79789)      Paraffin (25447)      US (62066)      Iontophoresis (96010)      Hot Pack 10'     Cold Pack            INTERVENTIONS:      Pt educ HEP for ROM forearm, wrist, hand.  See media       A/PROM forearm, wrist, hand          Power web  Red for wt bearing    Sponge   Grasp/hold                  Verbal and physical cues                                  Manual Therapy (86798)  STM, DTM,     (IASTM, Dry Needling, manual mobilization)            Splinting      Lcode:      Orthotic Mgmt, Subsequent Enc (82365)      Orthotic Mgmt & Training (14611)            Other:              Therapeutic Exercise & NMR:  [] (35992) Provided verbal/tactile cueing for activities related to strengthening, flexibility, endurance, ROM  for improvements in scapular, scapulothoracic and UE control with self care, reaching, carrying, lifting, house/yardwork, driving/computer work. [x] (59568) Provided verbal/tactile cueing for activities related to improving balance, coordination, kinesthetic sense, posture, motor skill, proprioception  to assist with  scapular, scapulothoracic and UE control with self care, reaching, carrying, lifting, house/yardwork, driving/computer work.     Therapeutic Activities & NMR:    [] (70953 or 65094) Provided verbal/tactile cueing for activities related to improving balance, coordination, kinesthetic sense, posture, motor skill, proprioception and motor activation to allow for proper function of scapular, scapulothoracic and UE control with self care, carrying, lifting, driving/computer work    Home Exercise Program:    [] (29044) Reviewed/Progressed HEP activities related to strengthening, flexibility, endurance, ROM of scapular, scapulothoracic and UE control with self care, reaching, carrying, lifting, house/yardwork, driving/computer work  [] (22209) Reviewed/Progressed HEP activities related to improving balance, coordination, kinesthetic sense, posture, motor skill, proprioception of scapular, scapulothoracic and UE control with self care, reaching, carrying, lifting, house/yardwork, driving/computer work      Manual Treatments:  PROM / STM / Oscillations-Mobs:  G-I, II, III, IV (PA's, Inf., Post.)  [] (86216) Provided manual therapy to mobilize soft tissue/joints of cervical/CT, scapular GHJ and UE for the purpose of modulating pain, promoting relaxation,  increasing ROM, reducing/eliminating soft tissue swelling/inflammation/restriction, improving soft tissue extensibility and allowing for proper ROM for normal function with self care, reaching, carrying, lifting, house/yardwork, driving/computer work    ADL Training:  [] (08437) Provided self-care/home management training related to activities of daily living and compensatory training, and/or use of adaptive equipment      Charges:  Timed Code Treatment Minutes: 45'   Total Treatment Minutes: 48'   Worker's Comp: Time In/Time Out     [] EVAL (LOW) 88191 (typically 20 minutes face-to-face)    [] EVAL (MOD) 59231 (typically 30 minutes face-to-face)  [] EVAL (HIGH) 02960 (typically 45 minutes face-to-face)  [] OT Re-eval (34462)       [x] Adrien (84173) x 1     [] OJXNE(56370)  [x] NMR (22596) x  1    [] Estim (attended) (93054)   [x] Manual ([id number]) x      [] US (61396)  [] TA (27405) x      [] Paraffin (86392)  [] ADL  (60797) x     [] Splint/L code:    [] Estim (unattended) (35437)  [x] Fluidotherapy (03116)  [] Other:    Comorbidities Affecting Functional Performance:     []Anxiety (F41.9)/Depression (F32.9)   []Diabetes Type 1(E10.65) or 2 (E11.65)   []Rheumatoid Arthritis (M05.9)  []Fibromyalgia (M79.7)  []Neuropathy(G60.9)  []Osteoarthritis(M19.91)  []None   []Other:    ASSESSMENT:      GOALS:  Patient stated goal: to close hand and use as before    []? Progressing: []? Met: []? Not Met: []? Adjusted     Therapist goals for Patient:   Short Term Goals: To be achieved in: 2 weeks  1. Independent in HEP and progression per patient tolerance, in order to prevent re-injury. []? Progressing: []? Met: []? Not Met: []? Adjusted   2. Patient will have a decrease in pain to facilitate improvement in movement, function, and ADLs as indicated by Functional Deficits. []? Progressing: []? Met: []? Not Met: []? Adjusted     Long Term Goals to be achieved in 6 weeks (through 3/18/22), including patient directed goals to address patient identified performance deficits:  1) Pt to be independent in graded HEP progression with a good level of effort and compliance. []? Progressing: []? Met: []? Not Met: []? Adjusted   2) Pt to report a score of </= 25 % on the Quick DASH disability questionnaire for increased performance with carrying, moving, and handling objects. []? Progressing: []? Met: []? Not Met: []?  Adjusted   3) Pt will demonstrate increased ROM to [facility] for improved independence with self care, home mgt tasks, driving, vocational tasks and sleeping.  []? Progressing: []? Met: []? Not Met: []? Adjusted   4) Pt will demonstrate increased  strength to at least 50% of uninjured hand for improved independence with self care, home mgt tasks, driving, and vocational tasks  []? Progressing: []? Met: []? Not Met: []? Adjusted   5) Pt will have a decrease in pain to 2/10 to facilitate independence with self care, home mgt tasks, driving, vocational tasks and sleeping.  []? Progressing: []? Met: []? Not Met: []? Adjusted    Overall Progression Towards Functional Goals/Treatment Progress Update:  [] Patient is progressing as expected towards functional goals listed. [] Progression is slowed due to complexities/impairments listed. [] Progression has been slowed due to co-morbidities. [x] Plan just implemented, too soon to assess goals progression <30 days  [] Goals require adjustment due to lack of progress  [] Patient is not progressing as expected and requires additional follow up with physician  [] All goals are met  [] Other:     Prognosis for POC: [x] Good [] Fair  [] Poor    Patient requires continued skilled intervention: [x] Yes  [] No    Treatment/Activity Tolerance:  [x] Patient able to complete treatment  [] Patient limited by fatigue  [] Patient limited by pain    [] Patient limited by other medical complications  [] Other:                  PLAN: See eval  [] Continue per plan of care [] Alter current plan (see comments above)  [x] Plan of care initiated [] Hold pending MD visit [] Discharge      Electronically signed by:  Richard LEON/L [id number]    Note: If patient does not return for scheduled/ recommended follow up visits, this note will serve as a discharge from care along with most recent update on progress.   Phone: [phone]  Fax [phone]

## 2022-02-07 ENCOUNTER — TELEPHONE (OUTPATIENT)
Dept: ORTHOPEDIC SURGERY | Age: 67
End: 2022-02-07

## 2022-02-09 ENCOUNTER — TELEPHONE (OUTPATIENT)
Dept: ORTHOPEDIC SURGERY | Age: 67
End: 2022-02-09

## 2022-02-09 ENCOUNTER — OFFICE VISIT (OUTPATIENT)
Dept: ORTHOPEDIC SURGERY | Age: 67
End: 2022-02-09
Payer: COMMERCIAL

## 2022-02-09 ENCOUNTER — HOSPITAL ENCOUNTER (OUTPATIENT)
Dept: OCCUPATIONAL THERAPY | Age: 67
Setting detail: THERAPIES SERIES
Discharge: HOME | End: 2022-02-09
Payer: COMMERCIAL

## 2022-02-09 VITALS — WEIGHT: 158 LBS | HEIGHT: 59 IN | BODY MASS INDEX: 31.85 KG/M2

## 2022-02-09 DIAGNOSIS — S52.532D CLOSED COLLES' FRACTURE OF LEFT RADIUS WITH ROUTINE HEALING, SUBSEQUENT ENCOUNTER: Primary | ICD-10-CM

## 2022-02-09 PROCEDURE — 97022 WHIRLPOOL THERAPY: CPT | Performed by: OCCUPATIONAL THERAPIST

## 2022-02-09 PROCEDURE — 97112 NEUROMUSCULAR REEDUCATION: CPT | Performed by: OCCUPATIONAL THERAPIST

## 2022-02-09 PROCEDURE — 97140 MANUAL THERAPY 1/> REGIONS: CPT | Performed by: OCCUPATIONAL THERAPIST

## 2022-02-09 PROCEDURE — 99213 OFFICE O/P EST LOW 20 MIN: CPT | Performed by: ORTHOPAEDIC SURGERY

## 2022-02-09 PROCEDURE — 97110 THERAPEUTIC EXERCISES: CPT | Performed by: OCCUPATIONAL THERAPIST

## 2022-02-09 NOTE — FLOWSHEET NOTE
[address] [place], [address] [place] [address] [address]  Phone: [phone] Fax: [phone]     Occupational Therapy Treatment Note/ Progress Report:     Is this a Progress Report:     []  Yes  [x]  No      If Yes:  Date Range for reporting period:  Beginning 22    Ending    Progress report will be due (10 Rx or 30 days whichever is less):     Recertification will be due (POC Duration  / 90 days whichever is less): 22   Patient: Thompson Blankenship                          : [birth year]                                  MRN: [id number]  Referring Physician:                                                   Evaluation Date: 2022                                         Medical Diagnosis Information: S52.532D (ICD-10-CM) - Closed Colles' fracture of left radius         Treatment Diagnosis: L wrist pain M25.532           Date of Injury: 23  Date of Surgery: N/A                                  Insurance information:  Aetna     Date of Patient follow up with Physician: 4 wks  Has the plan of care been signed (Y/N):        []  Yes  [x]  No       Visit # Insurance Allowable Auth Required   3 60 []  Yes [x]  No    From 22  to 2022      Preferred Language for Healthcare:   [x]English       []other:     RESTRICTIONS/PRECAUTIONS: 5# wt limit     Latex Allergy:  [x]? No      []? Yes                    Pacemaker:  [x]? No       []? Yes         Functional Scale: 66% (Quick DASH)                                 Date assessed:  2022        C-SSRS Triggered by Intake questionnaire (Past 2 wk assessment):    No, Questionnaire did not trigger screening.      SUBJECTIVE: Pt reports cont stiffness in fingers   Patient reported deficits/history of current problem: Pt fell as she got her foot caught on broken area of her deck     Pain Scale: 5-8/10       [x]? Constant                []?Intermittent []?other:  Pain Location:  Ulnar and radial wrist  Easing factors: rest  Provocative factors: AROM      OBJECTIVE:   Date:  Hand Dominance:     [x]? Right    []? Left 1/18/2022 1/31/22 2/9/22   Objective Measures:       PAIN 5-8/10 4/10    Quick DASH 66%     Digit  ROM         Index       MP:65  PIP:36  DIP:20  WFL  * loose fist                              Long MP:60  PIP:55  DIP:31    WFL  * loose fist                              Ring MP:60  PIP:55  DIP:34    WFL  * loose fist                              Small MP:65  PIP:40  DIP:40    WFL  * loose fist   Thumb ROM MP 50  IP 5     Thumb opposition  R: 2,3,4  L: 2,3,4,5    Thumb Radial/Palmar abd ROM R:  L:     Wrist ROM Ext/Flex R:50/28  L: 55/40    Rad/Uln dev ROM R:  L:     Forearm ROM  Sup/pron R:30/90  L: 55/90    Elbow ROM Ext/flex R:WFL  L:     Shoulder Flex  Shoulder Abd  Shoulder IR/ER    WFL     Edema in cm circumf. MCPJs R:  L:     Edema in cm circumf. Wrist R:15.9  L:18.2      strength in lbs R:  L:     Pinch Strengthin lbs: lat  R:  L:     Pinch Strength in lbs:  3 point R:  L:        MMT:        Observations:  (including splints, bandages, incisions, scars):               MODALITIES: 1/18/22 1/31/22 2/9/22   Fluidotherapy (54684)  12' 14   Estim (44143/31506)      Paraffin (9810 8537 (60678)   8' 50% @ 1.8  Volar forearm   Iontophoresis (76582)      Hot Pack 10'     Cold Pack            INTERVENTIONS:      Pt educ HEP for ROM forearm, wrist, hand.  See media       A/PROM forearm, wrist, hand A/PROM forearm, wrist, hand      Yellow putty  Rolling, shaping,  (tried mallet which was painful)   Power web  Red for wt bearing    Sponge   Grasp/hold       Red flex bar           Verbal and physical cues                                  Manual Therapy (28766)  STM, DTM,     (IASTM, Dry Needling, manual mobilization)            Splinting      Lcode:      Orthotic Mgmt, Subsequent Enc (27029)      Orthotic Mgmt & Training (07184) Other:              Therapeutic Exercise & NMR:  [] (39769) Provided verbal/tactile cueing for activities related to strengthening, flexibility, endurance, ROM  for improvements in scapular, scapulothoracic and UE control with self care, reaching, carrying, lifting, house/yardwork, driving/computer work. [x] (17296) Provided verbal/tactile cueing for activities related to improving balance, coordination, kinesthetic sense, posture, motor skill, proprioception  to assist with  scapular, scapulothoracic and UE control with self care, reaching, carrying, lifting, house/yardwork, driving/computer work.     Therapeutic Activities & NMR:    [] (89693 or 86086) Provided verbal/tactile cueing for activities related to improving balance, coordination, kinesthetic sense, posture, motor skill, proprioception and motor activation to allow for proper function of scapular, scapulothoracic and UE control with self care, carrying, lifting, driving/computer work    Home Exercise Program:    [] (87198) Reviewed/Progressed HEP activities related to strengthening, flexibility, endurance, ROM of scapular, scapulothoracic and UE control with self care, reaching, carrying, lifting, house/yardwork, driving/computer work  [] (52781) Reviewed/Progressed HEP activities related to improving balance, coordination, kinesthetic sense, posture, motor skill, proprioception of scapular, scapulothoracic and UE control with self care, reaching, carrying, lifting, house/yardwork, driving/computer work      Manual Treatments:  PROM / STM / Oscillations-Mobs:  G-I, II, III, IV (PA's, Inf., Post.)  [] (19403) Provided manual therapy to mobilize soft tissue/joints of cervical/CT, scapular GHJ and UE for the purpose of modulating pain, promoting relaxation,  increasing ROM, reducing/eliminating soft tissue swelling/inflammation/restriction, improving soft tissue extensibility and allowing for proper ROM for normal function with self care, reaching, carrying, lifting, house/yardwork, driving/computer work    ADL Training:  [] (88855) Provided self-care/home management training related to activities of daily living and compensatory training, and/or use of adaptive equipment      Charges:  Timed Code Treatment Minutes: 55'   Total Treatment Minutes: 61'   Worker's Comp: Time In/Time Out     [] EVAL (LOW) 26854 (typically 20 minutes face-to-face)    [] EVAL (MOD) 32582 (typically 30 minutes face-to-face)  [] EVAL (HIGH) [phone] (typically 45 minutes face-to-face)  [] OT Re-eval (79345)       [x] Adrien [phone] x 1     [] XGRPQ(43787)  [x] NMR (67013) x  1    [] Estim (attended) (89293)   [x] Manual ([id number]) x   1   [] US (33974)  [] TA () x      [] Paraffin (66633)  [] ADL  ([phone]) x     [] Splint/L code:    [] Estim (unattended) (22 [phone])  [x] Fluidotherapy (75240)  [] Other:    Comorbidities Affecting Functional Performance:     []Anxiety (F41.9)/Depression (F32.9)   []Diabetes Type 1(E10.65) or 2 (E11.65)   []Rheumatoid Arthritis (M05.9)  []Fibromyalgia (M79.7)  []Neuropathy(G60.9)  []Osteoarthritis(M19.91)  []None   []Other:    ASSESSMENT:      GOALS:  Patient stated goal: to close hand and use as before    []? Progressing: []? Met: []? Not Met: []? Adjusted     Therapist goals for Patient:   Short Term Goals: To be achieved in: 2 weeks  1. Independent in HEP and progression per patient tolerance, in order to prevent re-injury. []? Progressing: []? Met: []? Not Met: []? Adjusted   2. Patient will have a decrease in pain to facilitate improvement in movement, function, and ADLs as indicated by Functional Deficits. []? Progressing: []? Met: []? Not Met: []? Adjusted     Long Term Goals to be achieved in 6 weeks (through 3/18/22), including patient directed goals to address patient identified performance deficits:  1) Pt to be independent in graded HEP progression with a good level of effort and compliance. []? Progressing: []? Met: []? Not Met: []?  Adjusted 2) Pt to report a score of </= 25 % on the Quick DASH disability questionnaire for increased performance with carrying, moving, and handling objects. []? Progressing: []? Met: []? Not Met: []? Adjusted   3) [facility] will demonstrate [facility] for improved independence with self care, home mgt tasks, driving, vocational tasks and sleeping.  []? Progressing: []? Met: []? Not Met: []? Adjusted   4) Pt will demonstrate increased  strength to at least 50% of uninjured hand for improved independence with self care, home mgt tasks, driving, and vocational tasks  []? Progressing: []? Met: []? Not Met: []? Adjusted   5) Pt will have a decrease in pain to 2/10 to facilitate independence with self care, home mgt tasks, driving, vocational tasks and sleeping.  []? Progressing: []? Met: []? Not Met: []? Adjusted    Overall Progression Towards Functional Goals/Treatment Progress Update:  [] Patient is progressing as expected towards functional goals listed. [] Progression is slowed due to complexities/impairments listed. [] Progression has been slowed due to co-morbidities.   [x] Plan just implemented, too soon to assess goals progression <30 days  [] Goals require adjustment due to lack of progress  [] Patient is not progressing as expected and requires additional follow up with physician  [] All goals are met  [] Other:     Prognosis for POC: [x] Good [] Fair  [] Poor    Patient requires continued skilled intervention: [x] Yes  [] No    Treatment/Activity Tolerance:  [x] Patient able to complete treatment  [] Patient limited by fatigue  [] Patient limited by pain    [] Patient limited by other medical complications  [] Other:                  PLAN: See eval  [] Continue per plan of care [] Alter current plan (see comments above)  [x] Plan of care initiated [] Hold pending MD visit [] Discharge      Electronically signed by:  Cassie Hansen OTR/L [id number]    Note: If patient does not return for scheduled/ recommended follow up visits, this note will serve as a discharge from care along with most recent update on progress.   Phone: [phone]  Fax [phone]

## 2022-02-14 ENCOUNTER — TELEPHONE (OUTPATIENT)
Dept: ORTHOPEDIC SURGERY | Age: 67
End: 2022-02-14

## 2022-02-14 NOTE — PROGRESS NOTES
ORTHOPAEDIC SURGERY FOLLOWUP VISIT     CHIEF COMPLAINT:  Left wrist injury     DATE OF INJURY: 12/12/2021  DIAGNOSIS: Left distal radius fracture  DATE OF SURGERY: N/A     HISTORY OF PRESENT ILLNESS:  28-year-old right-hand-dominant female presents for repeat evaluation of left wrist fracture. It has now been 2 months since her injury. Her x-rays demonstrated a fracture of the distal radius, extra-articular with slight dorsal angulation and minor shortening. She was noted to have significant ulnar positive variance with ulnar impaction syndrome pre-existing to her fracture. CT scan was obtained to evaluate the anatomy and degree of angulation/shortening. She opted for nonoperative treatment. It has been 8 weeks since her injury. She has not had any wounds or skin issues as a result of cast immobilization. She states that her pain is a 4 to a 6 out of 10 on a day-to-day basis. She feels as though it is improved from the last visit. Her pain is primarily over the ulnar aspect of the wrist and does not radiate. It is worse with attempts at rotational movements of the forearm.     PHYSICAL EXAM:  General: Well-appearing female stated age. No acute distress. Left upper extremity: The patient presents in a removable wrist brace. This was removed in order to examine the skin. There are no cuts, open wounds, or abrasions. There is no significant ecchymosis about the wrist.  There is mild swelling primarily about the dorsal hand and distal radius. This is improved from prior eval.  Wrist range of motion demonstrates soreness with any movement. She has 50 degrees supination, 90 degrees pronation. This is within the limits of pain. She does have tenderness to palpation over the distal ulna. She has minimal movement to radial/ulnar deviation. She has pain-free movement of the elbow. Sensation is intact to light touch in median, radial, ulnar, and axillary distributions.   Motor function is intact to AIN, PIN, ulnar motor nerves. There is a strong palpable radial pulse, and brisk capillary refill to the fingers. Compartments are soft and compressible     RADIOGRAPHIC EXAM:  3 views of the left wrist including PA, oblique, and lateral x-rays demonstrate no change in alignment. There has been minor loss of radial height. There is chronic deformity of the distal radius. This is unchanged. There is sclerosis at the fracture site consistent with interval healing. There is sclerosis about the distal ulna as well as a matching ulnar aspect of the lunate bone consistent with chronic ulnar impaction syndrome. There is persistent dorsal angulation of the wrist unchanged from previous x-rays. There is overall disuse osteopenia/osteoporosis.     ASSESSMENT AND PLAN:  1. Left distal radius fracture  2. Left wrist ulnar impaction syndrome  3. 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  I reviewed with her once again her x-rays. She does have deformity which is pre-existing. I indicated to her that potentially this could be a problem for her once her bone is healed. I recommended that she continue to work with occupational therapy primarily to gain additional supination range of motion. I indicated to her that her radius seems to be healing well in good position. Potentially down the road, she would be a candidate for a Darrach type procedure or ulnar shortening. At this time, the patient will remain off work.   I would see her back in 4 weeks for repeat assessment with new x-rays at that time.      Christina Genao MD

## 2022-02-15 ENCOUNTER — TELEPHONE (OUTPATIENT)
Dept: ORTHOPEDIC SURGERY | Age: 67
End: 2022-02-15

## 2022-02-15 NOTE — TELEPHONE ENCOUNTER
Faxed REVISED aps to [facility] with [address] @ [phone] along with last office note      Claim # [id number]

## 2022-02-16 ENCOUNTER — HOSPITAL ENCOUNTER (OUTPATIENT)
Dept: OCCUPATIONAL THERAPY | Age: 67
Setting detail: THERAPIES SERIES
Discharge: HOME | End: 2022-02-16
Payer: COMMERCIAL

## 2022-02-16 PROCEDURE — 97035 APP MDLTY 1+ULTRASOUND EA 15: CPT | Performed by: OCCUPATIONAL THERAPIST

## 2022-02-16 PROCEDURE — 97022 WHIRLPOOL THERAPY: CPT | Performed by: OCCUPATIONAL THERAPIST

## 2022-02-16 PROCEDURE — 97110 THERAPEUTIC EXERCISES: CPT | Performed by: OCCUPATIONAL THERAPIST

## 2022-02-16 PROCEDURE — 97140 MANUAL THERAPY 1/> REGIONS: CPT | Performed by: OCCUPATIONAL THERAPIST

## 2022-02-16 NOTE — FLOWSHEET NOTE
2 [address] [place], [address] [place] [address] [address]  Phone: [phone] Fax: [phone]     Occupational Therapy Treatment Note/ Progress Report:     Is this a Progress Report:     []  Yes  [x]  No      If Yes:  Date Range for reporting period:  Beginning 22    Ending    Progress report will be due (10 Rx or 30 days whichever is less):     Recertification will be due (POC Duration  / 90 days whichever is less): 22   Patient: Abilio Campbell                          :                                   MRN: [id number]  Referring Physician:                                                   Evaluation Date: 2022                                         Medical Diagnosis Information: S52.532D (ICD-10-CM) - Closed Colles' fracture of left radius         Treatment Diagnosis: L wrist pain M25.532           Date of Injury: 21  Date of Surgery: N/A                                  Insurance information:  [employer]     Date of Patient follow up with Physician: 4 wks  Has the plan of care been signed (Y/N):        []  Yes  [x]  No       Visit # Insurance Allowable Auth Required   4 60 []  Yes [x]  No    From 22  to 2022      Preferred Language for Healthcare:   [x]English       []other:     RESTRICTIONS/PRECAUTIONS: 5# wt limit     Latex Allergy:  [x]? No      []? Yes                    Pacemaker:  [x]? No       []? Yes         Functional Scale: 66% (Quick DASH)                                 Date assessed:  2022        C-SSRS Triggered by Intake questionnaire (Past 2 wk assessment):    No, Questionnaire did not trigger screening.      SUBJECTIVE: Pt reports cont stiffness in fingers   Patient reported deficits/history of current problem: Pt fell as she got her foot caught on broken area of her deck     Pain Scale: 5-8/10       [x]? Constant                []?Intermittent []?other:  Pain Location:  Ulnar and radial wrist  Easing factors: rest  Provocative factors: AROM      OBJECTIVE:   Date:  Hand Dominance:     [x]? Right    []? Left 1/18/2022 1/31/22 2/9/22   Objective Measures:       PAIN 5-8/10 4/10    Quick DASH 66%     Digit  ROM         Index       MP:65  PIP:36  DIP:20  WFL  * loose fist                              Long MP:60  PIP:55  DIP:31    WFL  * loose fist                              Ring MP:60  PIP:55  DIP:34    WFL  * loose fist                              Small MP:65  PIP:40  DIP:40    WFL  * loose fist   Thumb ROM MP 50  IP 5     Thumb opposition  R: 2,3,4  L: 2,3,4,5    Thumb Radial/Palmar abd ROM R:  L:     Wrist ROM Ext/Flex R:50/28  L: 55/40    Rad/Uln dev ROM R:  L:     Forearm ROM  Sup/pron R:30/90  L: 55/90    Elbow ROM Ext/flex R:WFL  L:     Shoulder Flex  Shoulder Abd  Shoulder IR/ER    WFL     Edema in cm circumf. MCPJs R:  L:     Edema in cm circumf. Wrist R:15.9  L:18.2      strength in lbs R:  L:     Pinch Strengthin lbs: lat  R:  L:     Pinch Strength in lbs:  3 point R:  L:        MMT:        Observations:  (including splints, bandages, incisions, scars):               MODALITIES: 1/18/22 1/31/22 2/9/22 2/16/22   Fluidotherapy (46085)  12' 14 12'   Estim (99184/32180)       Paraffin (60278)       US (57311)   8' 50% @ 1.8  Volar forearm 8'   Iontophoresis (97062)       Hot Pack 10'      Cold Pack              INTERVENTIONS:       Pt educ HEP for ROM forearm, wrist, hand.  See media        A/PROM forearm, wrist, hand A/PROM forearm, wrist, hand A/PROM forearm, wrist, hand      Yellow putty  Rolling, shaping,  (tried mallet which was painful) Yellow putty  Rolling, shaping,  (tried mallet which was painful)   Power web  Red for wt bearing     Sponge   Grasp/hold  Yellow digiflex x 25      Red flex bar Red flex bar            Verbal and physical cues                                        Manual Therapy (77836)  STM, DTM,      (IASTM, Dry Needling, manual mobilization)              Splinting       Lcode:       Orthotic Mgmt, Subsequent Enc (31402)       Orthotic Mgmt & Training (40044)              Other:                Therapeutic Exercise & NMR:  [] (04860) Provided verbal/tactile cueing for activities related to strengthening, flexibility, endurance, ROM  for improvements in scapular, scapulothoracic and UE control with self care, reaching, carrying, lifting, house/yardwork, driving/computer work. [x] (91862) Provided verbal/tactile cueing for activities related to improving balance, coordination, kinesthetic sense, posture, motor skill, proprioception  to assist with  scapular, scapulothoracic and UE control with self care, reaching, carrying, lifting, house/yardwork, driving/computer work.     Therapeutic Activities & NMR:    [] (61825 or 00890) Provided verbal/tactile cueing for activities related to improving balance, coordination, kinesthetic sense, posture, motor skill, proprioception and motor activation to allow for proper function of scapular, scapulothoracic and UE control with self care, carrying, lifting, driving/computer work    Home Exercise Program:    [] (45304) Reviewed/Progressed HEP activities related to strengthening, flexibility, endurance, ROM of scapular, scapulothoracic and UE control with self care, reaching, carrying, lifting, house/yardwork, driving/computer work  [] (10247) Reviewed/Progressed HEP activities related to improving balance, coordination, kinesthetic sense, posture, motor skill, proprioception of scapular, scapulothoracic and UE control with self care, reaching, carrying, lifting, house/yardwork, driving/computer work      Manual Treatments:  PROM / STM / Oscillations-Mobs:  G-I, II, III, IV (PA's, Inf., Post.)  [] (14043) Provided manual therapy to mobilize soft tissue/joints of cervical/CT, scapular GHJ and UE for the purpose of modulating pain, promoting relaxation,  increasing ROM, reducing/eliminating soft tissue swelling/inflammation/restriction, improving soft tissue extensibility and allowing for proper ROM for normal function with self care, reaching, carrying, lifting, house/yardwork, driving/computer work    ADL Training:  [] (21600) Provided self-care/home management training related to activities of daily living and compensatory training, and/or use of adaptive equipment      Charges:  Timed Code Treatment Minutes: 55'   Total Treatment Minutes: 61'   Worker's Comp: Time In/Time Out     [] EVAL (LOW) 66901 (typically 20 minutes face-to-face)    [] EVAL (MOD) 23435 (typically 30 minutes face-to-face)  [] EVAL (HIGH) [phone] (typically 45 minutes face-to-face)  [] OT Re-eval (56734)       [x] Adrien (P7815256) x 1     [] OQOPC(19715)  [] NMR (73615) x  1    [] Estim (attended) (51590)   [x] Manual (G0607229) x   1   [x] US (89570)  [] TA (37764) x      [] Paraffin (00282)  [] ADL  (57056) x     [] Splint/L code:    [] Estim (unattended) 33 [phone] 31)  [x] Fluidotherapy (02959)  [] Other:    Comorbidities Affecting Functional Performance:     []Anxiety (F41.9)/Depression (F32.9)   []Diabetes Type 1(E10.65) or 2 (E11.65)   []Rheumatoid Arthritis (M05.9)  []Fibromyalgia (M79.7)  []Neuropathy(G60.9)  []Osteoarthritis(M19.91)  []None   []Other:    ASSESSMENT:      GOALS:  Patient stated goal: to close hand and use as before    []? Progressing: []? Met: []? Not Met: []? Adjusted     Therapist goals for Patient:   Short Term Goals: To be achieved in: 2 weeks  1. Independent in HEP and progression per patient tolerance, in order to prevent re-injury. []? Progressing: []? Met: []? Not Met: []? Adjusted   2. Patient will have a decrease in pain to facilitate improvement in movement, function, and ADLs as indicated by Functional Deficits. []? Progressing: []? Met: []? Not Met: []?  Adjusted     Long Term Goals to be achieved in 6 weeks (through 3/18/22), including patient directed goals to address patient identified performance deficits:  1) Pt to be independent in graded HEP progression with a good level of effort and compliance. []? Progressing: []? Met: []? Not Met: []? Adjusted   2) Pt to report a score of </= 25 % on the Quick DASH disability questionnaire for increased performance with carrying, moving, and handling objects. []? Progressing: []? Met: []? Not Met: []? Adjusted   3) Pt will demonstrate increased ROM to [facility] for improved independence with self care, home mgt tasks, driving, vocational tasks and sleeping.  []? Progressing: []? Met: []? Not Met: []? Adjusted   4) Pt will demonstrate increased  strength to at least 50% of uninjured hand for improved independence with self care, home mgt tasks, driving, and vocational tasks  []? Progressing: []? Met: []? Not Met: []? Adjusted   5) Pt will have a decrease in pain to 2/10 to facilitate independence with self care, home mgt tasks, driving, vocational tasks and sleeping.  []? Progressing: []? Met: []? Not Met: []? Adjusted    Overall Progression Towards Functional Goals/Treatment Progress Update:  [] Patient is progressing as expected towards functional goals listed. [] Progression is slowed due to complexities/impairments listed. [] Progression has been slowed due to co-morbidities.   [x] Plan just implemented, too soon to assess goals progression <30 days  [] Goals require adjustment due to lack of progress  [] Patient is not progressing as expected and requires additional follow up with physician  [] All goals are met  [] Other:     Prognosis for POC: [x] Good [] Fair  [] Poor    Patient requires continued skilled intervention: [x] Yes  [] No    Treatment/Activity Tolerance:  [x] Patient able to complete treatment  [] Patient limited by fatigue  [] Patient limited by pain    [] Patient limited by other medical complications  [] Other:                  PLAN: See eval  [] Continue per plan of care [] Alter current plan (see comments above)  [x] Plan of care initiated [] Hold pending MD visit [] Discharge      Electronically signed by:  Ibrahima Fonseca OTR/L [id number]    Note: If patient does not return for scheduled/ recommended follow up visits, this note will serve as a discharge from care along with most recent update on progress.   Phone: [phone]  Fax [phone]

## 2022-02-24 ENCOUNTER — TELEPHONE (OUTPATIENT)
Dept: ORTHOPEDIC SURGERY | Age: 67
End: 2022-02-24

## 2022-02-24 NOTE — TELEPHONE ENCOUNTER
I sent the last office note along with the completed paperwork. Dr. Joann Awad will need to dictate a letter as to why he is keeping her off work. I can't do more than what the dictation states. Please forward a letter outlining why he is keeping her off work to Nando Haro. Thank you.

## 2022-02-24 NOTE — TELEPHONE ENCOUNTER
I faxed the completed form and the last office note. I import the completed form that I complete on my desk top then import into epic. I don't import the office note as it is already in the chart. Does that make sense?

## 2022-02-24 NOTE — TELEPHONE ENCOUNTER
[facility] Patient Information     Facility Name: [facility]  Contact Name: [facility]  Contact Number: [phone]  Facility Fax Number: [phone]      Efren Lozano REQUESTING UPDATED DISABILITY FORM WITH DETAILS ON WHY THE PATIENT CAN NOT RETURN TO WORK AND HER RETURN TO WORK DATE

## 2022-02-24 NOTE — TELEPHONE ENCOUNTER
I don't see where you sent the last office visit to them. Per Dr. Kori Nick It has reasoning why she will need to continue to be off work.

## 2022-03-03 ENCOUNTER — HOSPITAL ENCOUNTER (OUTPATIENT)
Dept: OCCUPATIONAL THERAPY | Age: 67
Setting detail: THERAPIES SERIES
Discharge: HOME | End: 2022-03-03
Payer: COMMERCIAL

## 2022-03-03 PROCEDURE — 97140 MANUAL THERAPY 1/> REGIONS: CPT | Performed by: OCCUPATIONAL THERAPIST

## 2022-03-03 PROCEDURE — 97035 APP MDLTY 1+ULTRASOUND EA 15: CPT | Performed by: OCCUPATIONAL THERAPIST

## 2022-03-03 PROCEDURE — 97110 THERAPEUTIC EXERCISES: CPT | Performed by: OCCUPATIONAL THERAPIST

## 2022-03-03 PROCEDURE — 97022 WHIRLPOOL THERAPY: CPT | Performed by: OCCUPATIONAL THERAPIST

## 2022-03-03 NOTE — PROGRESS NOTES
[address] [place], [address] [place] [address] [address]  Phone: [phone] Fax: [phone]     Occupational Therapy Treatment Note/ Progress Report:     Is this a Progress Report:     []  Yes  [x]  No      If Yes:  Date Range for reporting period:  Beginning 22    Ending    Progress report will be due (10 Rx or 30 days whichever is less):     Recertification will be due (POC Duration  / 90 days whichever is less): 22   Patient: Rhoda Edwards                          : [birth year]                                  MRN: [id number]  Referring Physician:                                                   Evaluation Date: 2022                                         Medical Diagnosis Information: S52.532D (ICD-10-CM) - Closed Colles' fracture of left radius         Treatment Diagnosis: L wrist pain M25.532           Date of Injury: 21  Date of Surgery: N/A                                  Insurance information:  [employer]na     Date of Patient follow up with Physician: 4 wks  Has the plan of care been signed (Y/N):        []  Yes  [x]  No       Visit # Insurance Allowable Auth Required   5 60 []  Yes [x]  No    From 22  to 3/3/2022      Preferred Language for Healthcare:   [x]English       []other:     RESTRICTIONS/PRECAUTIONS: 5# wt limit     Latex Allergy:  [x]? No      []? Yes                    Pacemaker:  [x]? No       []? Yes         Functional Scale: 66% (Quick DASH)                                 Date assessed:  2022        C-SSRS Triggered by Intake questionnaire (Past 2 wk assessment):    No, Questionnaire did not trigger screening.      SUBJECTIVE: Pt reports \"My arm has been sore. I can tell I haven't been to therapy in a couple weeks\" Pt reports that she has been limited with attendance to therapy due to insurance complications.    Patient reported deficits/history of current problem: Pt fell as she got her foot caught on broken area of her deck     Pain Scale: 5-8/10       [x]? Constant                []?Intermittent              []?other:  Pain Location:  Ulnar and radial wrist  Easing factors: rest  Provocative factors: AROM      OBJECTIVE:   Date:  Hand Dominance:     [x]? Right    []? Left 1/18/2022     1/31/22 2/9/22 3/3/22   Objective Measures:        PAIN 5-8/10 4/10  5/10   Quick DASH 66%   57%   Digit  ROM         Index       MP:65  PIP:36  DIP:20  WFL  * loose fist WFL  * loose fist                              Long MP:60  PIP:55  DIP:31    WFL  * loose fist WFL  * loose fist                              Ring MP:60  PIP:55  DIP:34    WFL  * loose fist   WFL  * loose fist                              Small MP:65  PIP:40  DIP:40    WFL  * loose fist   WFL  * loose fist   Thumb ROM MP 50  IP 5      Thumb opposition  R: 2,3,4  L: 2,3,4,5  2,3,4,5   Thumb Radial/Palmar abd ROM R:  L:      Wrist ROM Ext/Flex R:50/28  L: 55/40  78/50   Rad/Uln dev ROM R:  L:      Forearm ROM  Sup/pron R:30/90  L: 55/90  70/90   Elbow ROM Ext/flex R:WFL  L:      Shoulder Flex  Shoulder Abd  Shoulder IR/ER    WFL      Edema in cm circumf. MCPJs R:  L:      Edema in cm circumf. Wrist R:15.9  L:18.2     17.2cm    strength in lbs R:  L:   R 13#  L 35#   Pinch Strengthin lbs: lat  R:  L:      Pinch Strength in lbs:  3 point R:  L:         MMT:         Observations:  (including splints, bandages, incisions, scars):           ASSESSMENT:  Pt has been making progress in all areas however has not met any long term goal areas yet. GOALS:  Patient stated goal: to close hand and use as before    []? Progressing: []? Met: []? Not Met: []? Adjusted     Therapist goals for Patient:   Short Term Goals: To be achieved in: 2 weeks  1. Independent in HEP and progression per patient tolerance, in order to prevent re-injury. []? Progressing: [x]? Met: []? Not Met: []? Adjusted   2.  Patient will have a decrease in pain to facilitate improvement in movement, function, and ADLs as indicated by Functional Deficits. []? Progressing: [x]? Met: []? Not Met: []? Adjusted     Long Term Goals to be achieved in 6 weeks (through 3/18/22), including patient directed goals to address patient identified performance deficits:  1) Pt to be independent in graded HEP progression with a good level of effort and compliance. [x]? Progressing: []? Met: []? Not Met: []? Adjusted   2) Pt to report a score of </= 25 % on the Quick DASH disability questionnaire for increased performance with carrying, moving, and handling objects. [x]? Progressing: []? Met: [x]? Not Met: []? Adjusted   3) Pt will demonstrate increased ROM to [facility] for improved independence with self care, home mgt tasks, driving, vocational tasks and sleeping. [x]? Progressing: []? Met: [x]? Not Met: []? Adjusted   4) Pt will demonstrate increased  strength to at least 50% of uninjured hand for improved independence with self care, home mgt tasks, driving, and vocational tasks  [x]? Progressing: []? Met: []? Not Met: []? Adjusted   5) Pt will have a decrease in pain to 2/10 to facilitate independence with self care, home mgt tasks, driving, vocational tasks and sleeping. [x]? Progressing: []? Met: []? Not Met: []? Adjusted      Overall Progression Towards Functional Goals/Treatment Progress Update:  [x] Patient is progressing as expected towards functional goals listed. [] Progression is slowed due to complexities/impairments listed. [] Progression has been slowed due to co-morbidities.   [x] Plan just implemented, too soon to assess goals progression <30 days  [] Goals require adjustment due to lack of progress  [] Patient is not progressing as expected and requires additional follow up with physician  [] All goals are met  [] Other:     Prognosis for POC: [x] Good [] Fair  [] Poor    Patient requires continued skilled intervention: [x] Yes  [] No    Treatment/Activity Tolerance:  [x] Patient able to complete treatment  [] Patient limited by fatigue  [] Patient limited by pain    [] Patient limited by other medical complications  [] Other:                  PLAN: See eval  [] Continue per plan of care [] Alter current plan (see comments above)  [x] Plan of care initiated [] Hold pending MD visit [] Discharge      Electronically signed by:  Damaris LEON/L [id number]    Note: If patient does not return for scheduled/ recommended follow up visits, this note will serve as a discharge from care along with most recent update on progress.   Phone: [phone]  Fax [phone]

## 2022-03-03 NOTE — FLOWSHEET NOTE
[address] 989 [facility], [address] [place] [address] [address]  Phone: [phone] Fax: [phone]     Occupational Therapy Treatment Note/ Progress Report:     Is this a Progress Report:     []  Yes  [x]  No      If Yes:  Date Range for reporting period:  Beginning 22    Ending    Progress report will be due (10 Rx or 30 days whichever is less):     Recertification will be due (POC Duration  / 90 days whichever is less): 22   Patient: Giancarlo Montoya                          : 1406                                  MRN: [id number]  Referring Physician:                                                   Evaluation Date: 2022                                         Medical Diagnosis Information: S52.532D (ICD-10-CM) - Closed Colles' fracture of left radius         Treatment Diagnosis: L wrist pain M25.532           Date of Injury: 21  Date of Surgery: N/A                                  Insurance information:  [employer]     Date of Patient follow up with Physician: 4 wks  Has the plan of care been signed (Y/N):        []  Yes  [x]  No       Visit # Insurance Allowable Auth Required   5 60 []  Yes [x]  No    From 22  to 3/3/2022      Preferred Language for Healthcare:   [x]English       []other:     RESTRICTIONS/PRECAUTIONS: 5# wt limit     Latex Allergy:  [x]? No      []? Yes                    Pacemaker:  [x]? No       []? Yes         Functional Scale: 66% (Quick DASH)                                 Date assessed:  2022        C-SSRS Triggered by Intake questionnaire (Past 2 wk assessment):    No, Questionnaire did not trigger screening.      SUBJECTIVE: Pt reports \"My arm has been sore. I can tell I haven't been to therapy in a couple weeks\"    Patient reported deficits/history of current problem: Pt fell as she got her foot caught on broken area of her deck     Pain Scale: 5-8/10       [x]? Constant []?Intermittent              []?other:  Pain Location:  Ulnar and radial wrist  Easing factors: rest  Provocative factors: AROM      OBJECTIVE:   Date:  Hand Dominance:     [x]? Right    []? Left 1/18/2022     1/31/22 2/9/22 3/3/22   Objective Measures:        PAIN 5-8/10 4/10  5/10   Quick DASH 66%   57%   Digit  ROM         Index       MP:65  PIP:36  DIP:20  WFL  * loose fist WFL  * loose fist                              Long MP:60  PIP:55  DIP:31    WFL  * loose fist WFL  * loose fist                              Ring MP:60  PIP:55  DIP:34    WFL  * loose fist   WFL  * loose fist                              Small MP:65  PIP:40  DIP:40    WFL  * loose fist   WFL  * loose fist   Thumb ROM MP 50  IP 5      Thumb opposition  R: 2,3,4  L: 2,3,4,5  2,3,4,5   Thumb Radial/Palmar abd ROM R:  L:      Wrist ROM Ext/Flex R:50/28  L: 55/40  78/50   Rad/Uln dev ROM R:  L:      Forearm ROM  Sup/pron R:30/90  L: 55/90  70/90   Elbow ROM Ext/flex R:WFL  L:      Shoulder Flex  Shoulder Abd  Shoulder IR/ER    WFL      Edema in cm circumf. MCPJs R:  L:      Edema in cm circumf. Wrist R:15.9  L:18.2     17.2cm    strength in lbs R:  L:   R 13#  L 35#   Pinch Strengthin lbs: lat  R:  L:      Pinch Strength in lbs:  3 point R:  L:         MMT:         Observations:  (including splints, bandages, incisions, scars):                MODALITIES: 1/18/22  1/31/22 2/9/22 2/16/22 3/3/22   Fluidotherapy (12798)  12' 14 12' 10'   Estim (51304/57053)        Paraffin (49526)        US (32226)   8' 50% @ 1.8  Volar forearm 8' 8'   Iontophoresis (84378)        Hot Pack 10'       Cold Pack                INTERVENTIONS:        Pt educ HEP for ROM forearm, wrist, hand.  See media         A/PROM forearm, wrist, hand A/PROM forearm, wrist, hand A/PROM forearm, wrist, hand A/PROM forearm, wrist, hand      Yellow putty  Rolling, shaping,  (tried mallet which was painful) Yellow putty  Rolling, shaping,  (tried mallet which was painful) Yellow putty  Rolling, shaping,     Power web  [employer] for wt bearing   Power web    Sponge   Grasp/hold  Yellow digiflex x 25       Red flex bar Red flex bar              Verbal and physical cues                                              Manual Therapy (22662)  STM, DTM,       (IASTM, Dry Needling, manual mobilization)                Splinting        Lcode:        Orthotic Mgmt, Subsequent Enc (13888)        Orthotic Mgmt & Training (73140)                Other:                  Therapeutic Exercise & NMR:  [] (27594) Provided verbal/tactile cueing for activities related to strengthening, flexibility, endurance, ROM  for improvements in scapular, scapulothoracic and UE control with self care, reaching, carrying, lifting, house/yardwork, driving/computer work. [x] (27855) Provided verbal/tactile cueing for activities related to improving balance, coordination, kinesthetic sense, posture, motor skill, proprioception  to assist with  scapular, scapulothoracic and UE control with self care, reaching, carrying, lifting, house/yardwork, driving/computer work.     Therapeutic Activities & NMR:    [] (42305 or 91952) Provided verbal/tactile cueing for activities related to improving balance, coordination, kinesthetic sense, posture, motor skill, proprioception and motor activation to allow for proper function of scapular, scapulothoracic and UE control with self care, carrying, lifting, driving/computer work    Home Exercise Program:    [] (15721) Reviewed/Progressed HEP activities related to strengthening, flexibility, endurance, ROM of scapular, scapulothoracic and UE control with self care, reaching, carrying, lifting, house/yardwork, driving/computer work  [] (46000) Reviewed/Progressed HEP activities related to improving balance, coordination, kinesthetic sense, posture, motor skill, proprioception of scapular, scapulothoracic and UE control with self care, reaching, carrying, lifting, house/yardwork, driving/computer work      Manual Treatments:  PROM / STM / Oscillations-Mobs:  G-I, II, III, IV (PA's, Inf., Post.)  [] (65343) Provided manual therapy to mobilize soft tissue/joints of cervical/CT, scapular GHJ and UE for the purpose of modulating pain, promoting relaxation,  increasing ROM, reducing/eliminating soft tissue swelling/inflammation/restriction, improving soft tissue extensibility and allowing for proper ROM for normal function with self care, reaching, carrying, lifting, house/yardwork, driving/computer work    ADL Training:  [] (85900) Provided self-care/home management training related to activities of daily living and compensatory training, and/or use of adaptive equipment      Charges:  Timed Code Treatment Minutes: 72'   Total Treatment Minutes: 72'   Worker's Comp: Time In/Time Out     [] EVAL (LOW) 54288 (typically 20 minutes face-to-face)    [] EVAL (MOD) 37741 (typically 30 minutes face-to-face)  [] EVAL (HIGH) [phone] (typically 45 minutes face-to-face)  [] OT Re-eval (23109)       [x] Adrien [phone]) x 1     [] GKKWC(11658)  [] NMR (89353) x  1    [] Estim (attended) (14731)   [x] Manual ([id number]) x   1   [x] US (98800)  [] TA (24822) x      [] Paraffin (40566)  [] ADL  ([phone]) x     [] Splint/L code:    [] Estim (unattended) ([phone])  [x] Fluidotherapy (65566)  [] Other:    Comorbidities Affecting Functional Performance:     []Anxiety (F41.9)/Depression (F32.9)   []Diabetes Type 1(E10.65) or 2 (E11.65)   []Rheumatoid Arthritis (M05.9)  []Fibromyalgia (M79.7)  []Neuropathy(G60.9)  []Osteoarthritis(M19.91)  []None   []Other:    ASSESSMENT:  Pt has been making progress in all areas however has not met any long term goal areas yet. GOALS:  Patient stated goal: to close hand and use as before    []? Progressing: []? Met: []? Not Met: []? Adjusted     Therapist goals for Patient:   Short Term Goals: To be achieved in: 2 weeks  1.  Independent in HEP and progression per patient tolerance, in order to prevent re-injury. []? Progressing: [x]? Met: []? Not Met: []? Adjusted   2. Patient will have a decrease in pain to facilitate improvement in movement, function, and ADLs as indicated by Functional Deficits. []? Progressing: [x]? Met: []? Not Met: []? Adjusted     Long Term Goals to be achieved in 6 weeks (through 3/18/22), including patient directed goals to address patient identified performance deficits:  1) Pt to be independent in graded HEP progression with a good level of effort and compliance. [x]? Progressing: []? Met: []? Not Met: []? Adjusted   2) Pt to report a score of </= 25 % on the Quick DASH disability questionnaire for increased performance with carrying, moving, and handling objects. [x]? Progressing: []? Met: [x]? Not Met: []? Adjusted   3) Pt will demonstrate increased ROM to [facility] for improved independence with self care, home mgt tasks, driving, vocational tasks and sleeping. [x]? Progressing: []? Met: [x]? Not Met: []? Adjusted   4) Pt will demonstrate increased  strength to at least 50% of uninjured hand for improved independence with self care, home mgt tasks, driving, and vocational tasks  [x]? Progressing: []? Met: []? Not Met: []? Adjusted   5) Pt will have a decrease in pain to 2/10 to facilitate independence with self care, home mgt tasks, driving, vocational tasks and sleeping. [x]? Progressing: []? Met: []? Not Met: []? Adjusted      Overall Progression Towards Functional Goals/Treatment Progress Update:  [] Patient is progressing as expected towards functional goals listed. [] Progression is slowed due to complexities/impairments listed. [] Progression has been slowed due to co-morbidities.   [x] Plan just implemented, too soon to assess goals progression <30 days  [] Goals require adjustment due to lack of progress  [] Patient is not progressing as expected and requires additional follow up with physician  [] All goals are met  [] Other:     Prognosis for POC: [x] Good [] Fair  [] Poor    Patient requires continued skilled intervention: [x] Yes  [] No    Treatment/Activity Tolerance:  [x] Patient able to complete treatment  [] Patient limited by fatigue  [] Patient limited by pain    [] Patient limited by other medical complications  [] Other:                  PLAN: See eval  [] Continue per plan of care [] Alter current plan (see comments above)  [x] Plan of care initiated [] Hold pending MD visit [] Discharge      Electronically signed by:  Sonja LEON/L [id number]    Note: If patient does not return for scheduled/ recommended follow up visits, this note will serve as a discharge from care along with most recent update on progress.   Phone: [phone]  Fax [phone]

## 2022-03-08 ENCOUNTER — OFFICE VISIT (OUTPATIENT)
Dept: ORTHOPEDIC SURGERY | Age: 67
End: 2022-03-08
Payer: COMMERCIAL

## 2022-03-08 DIAGNOSIS — S52.532D CLOSED COLLES' FRACTURE OF LEFT RADIUS WITH ROUTINE HEALING, SUBSEQUENT ENCOUNTER: Primary | ICD-10-CM

## 2022-03-08 PROCEDURE — 99213 OFFICE O/P EST LOW 20 MIN: CPT | Performed by: ORTHOPAEDIC SURGERY

## 2022-03-08 NOTE — PROGRESS NOTES
ORTHOPAEDIC SURGERY FOLLOWUP VISIT     CHIEF COMPLAINT:  Left wrist injury     DATE OF INJURY: 12/12/2021  DIAGNOSIS: Left distal radius fracture  DATE OF SURGERY: N/A     HISTORY OF PRESENT ILLNESS:  59-year-old right-hand-dominant female presents for repeat evaluation of left wrist fracture.  It has now been nearly 3 months since her injury.  Her x-rays demonstrated a fracture of the distal radius, extra-articular with slight dorsal angulation and minor shortening.  She was noted to have significant ulnar positive variance with ulnar impaction syndrome pre-existing to her fracture.  CT scan was obtained to evaluate the anatomy and degree of angulation/shortening.  She opted for nonoperative treatment.  It has been nearly 3 months since her injury.  She has not had any wounds or skin issues as a result of cast immobilization.  She states that her pain is a 3 out of 10 on a day-to-day basis. She feels as though it is improved from the last visit. Her pain is primarily over the ulnar aspect of the wrist and does not radiate. It is worse with attempts at rotational movements of the forearm. She feels some restriction to motion. She indicates she made some minor progress with OT.     PHYSICAL EXAM:  General: Well-appearing female stated age.  No acute distress. Left upper extremity: There are no cuts, open wounds, or abrasions.  There is no significant ecchymosis about the wrist.  There is mild swelling primarily about the dorsal hand and distal radius. This continues to improve from prior eval.  Wrist range of motion demonstrates soreness with any movement.  She has 50 degrees supination, 90 degrees pronation.  This is within the limits of pain.  She does have tenderness to palpation over the distal ulna and DRUJ.  There is 60 degrees wrist flexion, 40 degrees extension.  She has minimal movement to radial/ulnar deviation.  She has pain-free movement of the elbow. Sensation is intact to light touch in median, radial, ulnar, and axillary distributions.  Motor function is intact to AIN, PIN, ulnar motor nerves.  There is a strong palpable radial pulse, and brisk capillary refill to the fingers.  Compartments are soft and compressible     RADIOGRAPHIC EXAM:  3 views of the left wrist including PA, oblique, and lateral x-rays demonstrate no change in alignment. There has been minor loss of radial height. There is chronic deformity of the distal radius. This is unchanged. There is sclerosis at the fracture site consistent with interval healing. There is sclerosis about the distal ulna as well as a matching ulnar aspect of the lunate bone consistent with chronic ulnar impaction syndrome. There is persistent dorsal angulation of the wrist unchanged from previous x-rays. There is overall disuse osteopenia/osteoporosis.     ASSESSMENT AND PLAN:  1.  Left distal radius fracture  2.  Left wrist ulnar impaction syndrome  3.  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  I reviewed with her once again her x-rays. She does have deformity which is mostly pre-existing, but exacerbated by additional dorsal angulation/loss of radial height. .  I indicated to her that potentially this could be a problem for her once her bone is healed and she continues to have ulnar-sided wrist pain. .  I recommended that she continue to work with occupational therapy primarily to gain additional supination range of motion. I reviewed the potential surgical options with her including a Darrach type procedure or ulnar shortening. I indicated that this would be outside my skill set, and therefore I provided the patient with the names of some hand surgeons that I could potentially refer her to if she did indeed go forward with surgical intervention for her wrist.  She expressed understanding of this.   She would like to continue with occupational therapy and see where this means for her. At this time, the patient will remain off work.   I would see her back in 4 weeks.     Leanna Dobbins MD

## 2022-03-08 NOTE — PATIENT INSTRUCTIONS
Dr. Soni Montero MD     [address] [place] [address]    [address], [address]    [phone]

## 2022-03-15 ENCOUNTER — HOSPITAL ENCOUNTER (OUTPATIENT)
Dept: OCCUPATIONAL THERAPY | Age: 67
Setting detail: THERAPIES SERIES
Discharge: HOME | End: 2022-03-15
Payer: COMMERCIAL

## 2022-03-15 PROCEDURE — 97022 WHIRLPOOL THERAPY: CPT | Performed by: OCCUPATIONAL THERAPIST

## 2022-03-15 PROCEDURE — 97035 APP MDLTY 1+ULTRASOUND EA 15: CPT | Performed by: OCCUPATIONAL THERAPIST

## 2022-03-15 PROCEDURE — 97140 MANUAL THERAPY 1/> REGIONS: CPT | Performed by: OCCUPATIONAL THERAPIST

## 2022-03-15 PROCEDURE — 97110 THERAPEUTIC EXERCISES: CPT | Performed by: OCCUPATIONAL THERAPIST

## 2022-03-15 NOTE — FLOWSHEET NOTE
[address] [place], [address] [place] [address] [address]  Phone: [phone] Fax: [phone]     Occupational Therapy Treatment Note/ Progress Report:     Is this a Progress Report:     []  Yes  [x]  No      If Yes:  Date Range for reporting period:  Beginning 22    Ending    Progress report will be due (10 Rx or 30 days whichever is less):     Recertification will be due (POC Duration  / 90 days whichever is less): 22   Patient: Nataliia Bar                          :                                   MRN: [id number]  Referring Physician:                                                   Evaluation Date: 2022                                         Medical Diagnosis Information: S52.532D (ICD-10-CM) - Closed Colles' fracture of left radius         Treatment Diagnosis: L wrist pain M25.532           Date of Injury: 21  Date of Surgery: N/A                                  Insurance information:  [employer]     Date of Patient follow up with Physician: 4 wks  Has the plan of care been signed (Y/N):        []  Yes  [x]  No       Visit # Insurance Allowable Auth Required   6 60 []  Yes [x]  No    From 22  to 3/15/2022      Preferred Language for Healthcare:   [x]English       []other:     RESTRICTIONS/PRECAUTIONS: 5# wt limit     Latex Allergy:  [x]? No      []? Yes                    Pacemaker:  [x]? No       []? Yes         Functional Scale: 66% (Quick DASH)                                 Date assessed:  2022        C-SSRS Triggered by Intake questionnaire (Past 2 wk assessment):    No, Questionnaire did not trigger screening.      SUBJECTIVE: Pt reports continued pain especially at DRUJ'   Patient reported deficits/history of current problem: Pt fell as she got her foot caught on broken area of her deck     Pain Scale: 5-8/10       [x]? Constant                []?Intermittent []?other:  Pain Location:  Ulnar and radial wrist  Easing factors: rest  Provocative factors: AROM      OBJECTIVE:   Date:  Hand Dominance:     [x]? Right    []? Left 1/18/2022     1/31/22 2/9/22 3/3/22 3/15/22   Objective Measures:         PAIN 5-8/10 4/10  5/10 4/10   Quick DASH 66%   57%    Digit  ROM         Index       MP:65  PIP:36  DIP:20  WFL  * loose fist WFL  * loose fist                               Long MP:60  PIP:55  DIP:31    WFL  * loose fist WFL  * loose fist                               Ring MP:60  PIP:55  DIP:34    WFL  * loose fist   WFL  * loose fist                               Small MP:65  PIP:40  DIP:40    WFL  * loose fist   WFL  * loose fist    Thumb ROM MP 50  IP 5       Thumb opposition  R: 2,3,4  L: 2,3,4,5  2,3,4,5    Thumb Radial/Palmar abd ROM R:  L:       Wrist ROM Ext/Flex R:50/28  L: 55/40  78/50    Rad/Uln dev ROM R:  L:       Forearm ROM  Sup/pron R:30/90  L: 55/90  70/90    Elbow ROM Ext/flex R:WFL  L:       Shoulder Flex  Shoulder Abd  Shoulder IR/ER    WFL       Edema in cm circumf. MCPJs R:  L:       Edema in cm circumf. Wrist R:15.9  L:18.2     17.2cm     strength in lbs R:  L:   R 13#  L 35#    Pinch Strengthin lbs: lat  R:  L:       Pinch Strength in lbs:  3 point R:  L:          MMT:          Observations:  (including splints, bandages, incisions, scars):                 MODALITIES: 1/18/22  1/31/22 2/9/22 2/16/22 3/3/22 3/15/22   Fluidotherapy (31648)  12' 14 12' 10' 15'   Estim (57009/48509)         Paraffin (86394)         US (31541)   8' 50% @ 1.8  Volar forearm 8' 8' 8   Iontophoresis (73149)         Hot Pack 10'        Cold Pack                  INTERVENTIONS:         Pt educ HEP for ROM forearm, wrist, hand.  See media          A/PROM forearm, wrist, hand A/PROM forearm, wrist, hand A/PROM forearm, wrist, hand A/PROM forearm, wrist, hand       Yellow putty  Rolling, shaping,  (tried mallet which was painful) Yellow putty  Rolling, shaping,  (tried mallet which was painful) Yellow putty  Rolling, shaping,   Yellow putty  Rolling, shaping,   Power web  [employer] for wt bearing   Power web     Sponge   Grasp/hold  Yellow digiflex x 25  Power  with sponge       Red flex bar Red flex bar  Red flex bar  Wrist flex/ext x25              Verbal and physical cues                                                    Manual Therapy (72933)  STM, DTM,     STM,, PROM   (IASTM, Dry Needling, manual mobilization)                  Splinting         Lcode:         Orthotic Mgmt, Subsequent Enc (65047)         Orthotic Mgmt & Training (79736)                  Other:                    Therapeutic Exercise & NMR:  [] (19637) Provided verbal/tactile cueing for activities related to strengthening, flexibility, endurance, ROM  for improvements in scapular, scapulothoracic and UE control with self care, reaching, carrying, lifting, house/yardwork, driving/computer work. [x] (15620) Provided verbal/tactile cueing for activities related to improving balance, coordination, kinesthetic sense, posture, motor skill, proprioception  to assist with  scapular, scapulothoracic and UE control with self care, reaching, carrying, lifting, house/yardwork, driving/computer work.     Therapeutic Activities & NMR:    [] (78054 or 80759) Provided verbal/tactile cueing for activities related to improving balance, coordination, kinesthetic sense, posture, motor skill, proprioception and motor activation to allow for proper function of scapular, scapulothoracic and UE control with self care, carrying, lifting, driving/computer work    Home Exercise Program:    [] (78868) Reviewed/Progressed HEP activities related to strengthening, flexibility, endurance, ROM of scapular, scapulothoracic and UE control with self care, reaching, carrying, lifting, house/yardwork, driving/computer work  [] (40036) Reviewed/Progressed HEP activities related to improving balance, coordination, kinesthetic sense, posture, motor skill, proprioception of scapular, scapulothoracic and UE control with self care, reaching, carrying, lifting, house/yardwork, driving/computer work      Manual Treatments:  PROM / STM / Oscillations-Mobs:  G-I, II, III, IV (PA's, Inf., Post.)  [] (00735) Provided manual therapy to mobilize soft tissue/joints of cervical/CT, scapular GHJ and UE for the purpose of modulating pain, promoting relaxation,  increasing ROM, reducing/eliminating soft tissue swelling/inflammation/restriction, improving soft tissue extensibility and allowing for proper ROM for normal function with self care, reaching, carrying, lifting, house/yardwork, driving/computer work    ADL Training:  [] (26834) Provided self-care/home management training related to activities of daily living and compensatory training, and/or use of adaptive equipment      Charges:  Timed Code Treatment Minutes: 40'   Total Treatment Minutes: 54''   Worker's Comp: Time In/Time Out     [] EVAL (LOW) 23614 (typically 20 minutes face-to-face)    [] EVAL (MOD) 56042 (typically 30 minutes face-to-face)  [] EVAL (HIGH) 14094 (typically 45 minutes face-to-face)  [] OT Re-eval (72874)       [x] Adrien [phone]) x 1     [] OSVTL(90357)  [] NMR (69184) x  1    [] Estim (attended) (47765)   [x] Manual ([id number]) x   1   [x] US (83045)  [] TA (09082) x      [] Paraffin (82305)  [] ADL  ([phone]) x     [] Splint/L code:    [] Estim (unattended) ([phone])  [x] Fluidotherapy (72111)  [] Other:    Comorbidities Affecting Functional Performance:     []Anxiety (F41.9)/Depression (F32.9)   []Diabetes Type 1(E10.65) or 2 (E11.65)   []Rheumatoid Arthritis (M05.9)  []Fibromyalgia (M79.7)  []Neuropathy(G60.9)  []Osteoarthritis(M19.91)  []None   []Other:    ASSESSMENT:  Pt has been making progress in all areas however has not met any long term goal areas yet. GOALS:  Patient stated goal: to close hand and use as before    []? Progressing: []? Met: []? Not Met: []?  Adjusted     Therapist goals for Patient:   Short Term Goals: To be achieved in: 2 weeks  1. Independent in HEP and progression per patient tolerance, in order to prevent re-injury. []? Progressing: [x]? Met: []? Not Met: []? Adjusted   2. Patient will have a decrease in pain to facilitate improvement in movement, function, and ADLs as indicated by Functional Deficits. []? Progressing: [x]? Met: []? Not Met: []? Adjusted     Long Term Goals to be achieved in 6 weeks (through 3/18/22), including patient directed goals to address patient identified performance deficits:  1) Pt to be independent in graded HEP progression with a good level of effort and compliance. [x]? Progressing: []? Met: []? Not Met: []? Adjusted   2) Pt to report a score of </= 25 % on the Quick DASH disability questionnaire for increased performance with carrying, moving, and handling objects. [x]? Progressing: []? Met: [x]? Not Met: []? Adjusted   3) Pt will demonstrate increased ROM to [facility] for improved independence with self care, home mgt tasks, driving, vocational tasks and sleeping. [x]? Progressing: []? Met: [x]? Not Met: []? Adjusted   4) Pt will demonstrate increased  strength to at least 50% of uninjured hand for improved independence with self care, home mgt tasks, driving, and vocational tasks  [x]? Progressing: []? Met: []? Not Met: []? Adjusted   5) Pt will have a decrease in pain to 2/10 to facilitate independence with self care, home mgt tasks, driving, vocational tasks and sleeping. [x]? Progressing: []? Met: []? Not Met: []? Adjusted      Overall Progression Towards Functional Goals/Treatment Progress Update:  [] Patient is progressing as expected towards functional goals listed. [] Progression is slowed due to complexities/impairments listed. [] Progression has been slowed due to co-morbidities.   [x] Plan just implemented, too soon to assess goals progression <30 days  [] Goals require adjustment due to lack of progress  [] Patient is not progressing as expected and requires additional follow up with physician  [] All goals are met  [] Other:     Prognosis for POC: [x] Good [] Fair  [] Poor    Patient requires continued skilled intervention: [x] Yes  [] No    Treatment/Activity Tolerance:  [x] Patient able to complete treatment  [] Patient limited by fatigue  [] Patient limited by pain    [] Patient limited by other medical complications  [] Other:                  PLAN: See eval  [] Continue per plan of care [] Alter current plan (see comments above)  [x] Plan of care initiated [] Hold pending MD visit [] Discharge      Electronically signed by:  Juanjo Langford OTR/L [id number]    Note: If patient does not return for scheduled/ recommended follow up visits, this note will serve as a discharge from care along with most recent update on progress.   Phone: [phone]  Fax [phone]

## 2022-03-21 ENCOUNTER — HOSPITAL ENCOUNTER (OUTPATIENT)
Dept: OCCUPATIONAL THERAPY | Age: 67
Setting detail: THERAPIES SERIES
Discharge: HOME | End: 2022-03-21
Payer: COMMERCIAL

## 2022-03-21 ENCOUNTER — TELEPHONE (OUTPATIENT)
Dept: ORTHOPEDIC SURGERY | Age: 67
End: 2022-03-21

## 2022-03-21 PROCEDURE — 97110 THERAPEUTIC EXERCISES: CPT | Performed by: OCCUPATIONAL THERAPIST

## 2022-03-21 PROCEDURE — 97022 WHIRLPOOL THERAPY: CPT | Performed by: OCCUPATIONAL THERAPIST

## 2022-03-21 PROCEDURE — 97035 APP MDLTY 1+ULTRASOUND EA 15: CPT | Performed by: OCCUPATIONAL THERAPIST

## 2022-03-21 PROCEDURE — 97140 MANUAL THERAPY 1/> REGIONS: CPT | Performed by: OCCUPATIONAL THERAPIST

## 2022-03-21 NOTE — FLOWSHEET NOTE
2 [address] [place], [address] [place] [address] [address]  Phone: [phone] Fax: [phone]     Occupational Therapy Treatment Note/ Progress Report:     Is this a Progress Report:     []  Yes  [x]  No      If Yes:  Date Range for reporting period:  Beginning 22    Ending    Progress report will be due (10 Rx or 30 days whichever is less):     Recertification will be due (POC Duration  / 90 days whichever is less): 22   Patient: Barrett Ballesteros                          : [id number]                                  MRN: [id number]  Referring Physician:                                                   Evaluation Date: 2022                                         Medical Diagnosis Information: S52.532D (ICD-10-CM) - Closed Colles' fracture of left radius         Treatment Diagnosis: L wrist pain M25.532           Date of Injury: 21  Date of Surgery: N/A                                  Insurance information:  [employer]     Date of Patient follow up with Physician: 4 wks  Has the plan of care been signed (Y/N):        []  Yes  [x]  No       Visit # Insurance Allowable Auth Required   7 60 []  Yes [x]  No    From 22  to 3/21/2022      Preferred Language for Healthcare:   [x]English       []other:     RESTRICTIONS/PRECAUTIONS: 5# wt limit     Latex Allergy:  [x]? No      []? Yes                    Pacemaker:  [x]? No       []? Yes         Functional Scale: 66% (Quick DASH)                                 Date assessed:  2022        C-SSRS Triggered by Intake questionnaire (Past 2 wk assessment):    No, Questionnaire did not trigger screening.      SUBJECTIVE: Pt reports continued pain especially at DRUJ' at 4-5/10. Pt reports some progress with functional use however frustrated with pain.  Also reports that kinesiotape was helpful in decreasing pain   Patient reported deficits/history of current problem: Pt fell as she got her foot caught on broken area of her deck     Pain Scale: 5-8/10       [x]? Constant                []?Intermittent              []?other:  Pain Location:  Ulnar and radial wrist  Easing factors: rest  Provocative factors: AROM      OBJECTIVE:   Date:  Hand Dominance:     [x]? Right    []? Left 1/18/2022     1/31/22 2/9/22 3/3/22 3/15/22   Objective Measures:         PAIN 5-8/10 4/10  5/10 4/10   Quick DASH 66%   57%    Digit  ROM         Index       MP:65  PIP:36  DIP:20  WFL  * loose fist WFL  * loose fist                               Long MP:60  PIP:55  DIP:31    WFL  * loose fist WFL  * loose fist                               Ring MP:60  PIP:55  DIP:34    WFL  * loose fist   WFL  * loose fist                               Small MP:65  PIP:40  DIP:40    WFL  * loose fist   WFL  * loose fist    Thumb ROM MP 50  IP 5       Thumb opposition  R: 2,3,4  L: 2,3,4,5  2,3,4,5    Thumb Radial/Palmar abd ROM R:  L:       Wrist ROM Ext/Flex R:50/28  L: 55/40  78/50    Rad/Uln dev ROM R:  L:       Forearm ROM  Sup/pron R:30/90  L: 55/90  70/90    Elbow ROM Ext/flex R:WFL  L:       Shoulder Flex  Shoulder Abd  Shoulder IR/ER    WFL       Edema in cm circumf. MCPJs R:  L:       Edema in cm circumf. Wrist R:15.9  L:18.2     17.2cm     strength in lbs R:  L:   R 13#  L 35#    Pinch Strengthin lbs: lat  R:  L:       Pinch Strength in lbs:  3 point R:  L:          MMT:          Observations:  (including splints, bandages, incisions, scars):                 MODALITIES: 1/18/22  1/31/22 2/9/22 2/16/22 3/3/22 3/15/22 3/21/22   Fluidotherapy (11520)  12' 14 12' 10' 15' 12   Estim (01751/73955)          Paraffin (62607)          US (63111)   8' 50% @ 1.8  Volar forearm 8' 8' 8 8'   Iontophoresis (37289)          Hot Pack 10'         Cold Pack                    INTERVENTIONS:          Pt educ HEP for ROM forearm, wrist, hand.  See media           A/PROM forearm, wrist, hand A/PROM forearm, wrist, hand A/PROM forearm, wrist, hand A/PROM forearm, wrist, hand        Yellow putty  Rolling, shaping,  (tried mallet which was painful) Yellow putty  Rolling, shaping,  (tried mallet which was painful) Yellow putty  Rolling, shaping,   Yellow putty  Rolling, shaping, Yellow putty  shaping   Power web  Red for wt bearing   Power web      Sponge   Grasp/hold  Yellow digiflex x 25  Power  with sponge  Power  #2 2x10      Red flex bar Red flex bar  Red flex bar  Wrist flex/ext x25                Verbal and physical cues                        kinesio tape at wrist kinesio tape at wrist                                 Manual Therapy (85608)  STM, DTM,     STM,, PROM STM,, PROM   (IASTM, Dry Needling, manual mobilization)                    Splinting          Lcode:          Orthotic Mgmt, Subsequent Enc (68231)          Orthotic Mgmt & Training (62485)                    Other:                      Therapeutic Exercise & NMR:  [] (71995) Provided verbal/tactile cueing for activities related to strengthening, flexibility, endurance, ROM  for improvements in scapular, scapulothoracic and UE control with self care, reaching, carrying, lifting, house/yardwork, driving/computer work. [x] (17825) Provided verbal/tactile cueing for activities related to improving balance, coordination, kinesthetic sense, posture, motor skill, proprioception  to assist with  scapular, scapulothoracic and UE control with self care, reaching, carrying, lifting, house/yardwork, driving/computer work.     Therapeutic Activities & NMR:    [] (68300 or 70042) Provided verbal/tactile cueing for activities related to improving balance, coordination, kinesthetic sense, posture, motor skill, proprioception and motor activation to allow for proper function of scapular, scapulothoracic and UE control with self care, carrying, lifting, driving/computer work    Home Exercise Program:    [] (71745) Reviewed/Progressed HEP activities related to (M79.7)  []Neuropathy(G60.9)  []Osteoarthritis(M19.91)  []None   []Other:    ASSESSMENT:  Pt has been making progress in all areas however has not met any long term goal areas yet. GOALS:  Patient stated goal: to close hand and use as before    []? Progressing: []? Met: []? Not Met: []? Adjusted     Therapist goals for Patient:   Short Term Goals: To be achieved in: 2 weeks  1. Independent in HEP and progression per patient tolerance, in order to prevent re-injury. []? Progressing: [x]? Met: []? Not Met: []? Adjusted   2. Patient will have a decrease in pain to facilitate improvement in movement, function, and ADLs as indicated by Functional Deficits. []? Progressing: [x]? Met: []? Not Met: []? Adjusted     Long Term Goals to be achieved in 6 weeks (through 3/18/22), including patient directed goals to address patient identified performance deficits:  1) Pt to be independent in graded HEP progression with a good level of effort and compliance. [x]? Progressing: []? Met: []? Not Met: []? Adjusted   2) Pt to report a score of </= 25 % on the Quick DASH disability questionnaire for increased performance with carrying, moving, and handling objects. [x]? Progressing: []? Met: [x]? Not Met: []? Adjusted   3) Pt will demonstrate increased ROM to [facility] for improved independence with self care, home mgt tasks, driving, vocational tasks and sleeping. [x]? Progressing: []? Met: [x]? Not Met: []? Adjusted   4) Pt will demonstrate increased  strength to at least 50% of uninjured hand for improved independence with self care, home mgt tasks, driving, and vocational tasks  [x]? Progressing: []? Met: []? Not Met: []? Adjusted   5) Pt will have a decrease in pain to 2/10 to facilitate independence with self care, home mgt tasks, driving, vocational tasks and sleeping. [x]? Progressing: []? Met: []? Not Met: []?  Adjusted      Overall Progression Towards Functional Goals/Treatment Progress Update:  [] Patient is progressing as expected towards functional goals listed. [] Progression is slowed due to complexities/impairments listed. [] Progression has been slowed due to co-morbidities. [x] Plan just implemented, too soon to assess goals progression <30 days  [] Goals require adjustment due to lack of progress  [] Patient is not progressing as expected and requires additional follow up with physician  [] All goals are met  [] Other:     Prognosis for POC: [x] Good [] Fair  [] Poor    Patient requires continued skilled intervention: [x] Yes  [] No    Treatment/Activity Tolerance:  [x] Patient able to complete treatment  [] Patient limited by fatigue  [] Patient limited by pain    [] Patient limited by other medical complications  [] Other:                  PLAN: See eval  [] Continue per plan of care [] Alter current plan (see comments above)  [x] Plan of care initiated [] Hold pending MD visit [] Discharge      Electronically signed by:  Dixon LEON/L [id number]    Note: If patient does not return for scheduled/ recommended follow up visits, this note will serve as a discharge from care along with most recent update on progress.   Phone: [phone]  Fax [phone]

## 2022-03-30 ENCOUNTER — HOSPITAL ENCOUNTER (OUTPATIENT)
Dept: OCCUPATIONAL THERAPY | Age: 67
Setting detail: THERAPIES SERIES
Discharge: HOME | End: 2022-03-30
Payer: COMMERCIAL

## 2022-03-30 PROCEDURE — 97022 WHIRLPOOL THERAPY: CPT | Performed by: OCCUPATIONAL THERAPIST

## 2022-03-30 PROCEDURE — 97035 APP MDLTY 1+ULTRASOUND EA 15: CPT | Performed by: OCCUPATIONAL THERAPIST

## 2022-03-30 PROCEDURE — 97140 MANUAL THERAPY 1/> REGIONS: CPT | Performed by: OCCUPATIONAL THERAPIST

## 2022-03-30 PROCEDURE — 97110 THERAPEUTIC EXERCISES: CPT | Performed by: OCCUPATIONAL THERAPIST

## 2022-03-30 NOTE — FLOWSHEET NOTE
[address] [place], [address] [place] [address] [address]  Phone: [phone] Fax: [phone]     Occupational Therapy Treatment Note/ Progress Report:     Is this a Progress Report:     []  Yes  [x]  No      If Yes:  Date Range for reporting period:  Beginning 22    Ending    Progress report will be due (10 Rx or 30 days whichever is less): 3/31/39    Recertification will be due (POC Duration  / 90 days whichever is less): 22   Patient: Uvaldo Villanueva                          :                                   MRN: [id number]  Referring Physician:                                                   Evaluation Date: 2022                                         Medical Diagnosis Information: S52.532D (ICD-10-CM) - Closed Colles' fracture of left radius         Treatment Diagnosis: L wrist pain M25.532           Date of Injury: 21  Date of Surgery: N/A                                  Insurance information:  Aetna     Date of Patient follow up with Physician: 4 wks  Has the plan of care been signed (Y/N):        []  Yes  [x]  No       Visit # Insurance Allowable Auth Required   7 60 []  Yes [x]  No    From 22  to 3/30/2022      Preferred Language for Healthcare:   [x]English       []other:     RESTRICTIONS/PRECAUTIONS: 5# wt limit     Latex Allergy:  [x]? No      []? Yes                    Pacemaker:  [x]? No       []? Yes         Functional Scale: 66% (Quick DASH)                                 Date assessed:  2022        C-SSRS Triggered by Intake questionnaire (Past 2 wk assessment):    No, Questionnaire did not trigger screening.      SUBJECTIVE: Pt reports significant decrease in pain at DRUJ. Primary complaint is little finger stiffness and reaching behind to attach bra.     Patient reported deficits/history of current problem: Pt fell as she got her foot caught on broken area of her jeni        OBJECTIVE:   Date:  Hand Dominance:     [x]? Right    []? Left 1/18/2022     1/31/22 2/9/22 3/3/22 3/15/22    Objective Measures:          PAIN 5-8/10 4/10  5/10 4/10    Quick DASH 66%   57%     Digit  ROM         Index       MP:65  PIP:36  DIP:20  WFL  * loose fist WFL  * loose fist                                Long MP:60  PIP:55  DIP:31    WFL  * loose fist WFL  * loose fist                                Ring MP:60  PIP:55  DIP:34    WFL  * loose fist   WFL  * loose fist                                Small MP:65  PIP:40  DIP:40    WFL  * loose fist   WFL  * loose fist     Thumb ROM MP 50  IP 5        Thumb opposition  R: 2,3,4  L: 2,3,4,5  2,3,4,5     Thumb Radial/Palmar abd ROM R:  L:        Wrist ROM Ext/Flex R:50/28  L: 55/40  78/50     Rad/Uln dev ROM R:  L:        Forearm ROM  Sup/pron R:30/90  L: 55/90  70/90     Elbow ROM Ext/flex R:WFL  L:        Shoulder Flex  Shoulder Abd  Shoulder IR/ER    WFL        Edema in cm circumf. MCPJs R:  L:        Edema in cm circumf.   Wrist R:15.9  L:18.2     17.2cm      strength in lbs R:  L:   R 13#  L 35#     Pinch Strengthin lbs: lat  R:  L:        Pinch Strength in lbs:  3 point R:  L:           MMT:           Observations:  (including splints, bandages, incisions, scars):                  MODALITIES: 2/16/22 3/3/22 3/15/22 3/21/22 3/30/22   Fluidotherapy (33188) 12' 10' 15' 12 10'   Estim (40689/63711)        Paraffin (59646)        US (06301) 8' 8' 8 8' 8'   Iontophoresis (97437)        Hot Pack        Cold Pack                INTERVENTIONS:        Pt educ         A/PROM forearm, wrist, hand A/PROM forearm, wrist, hand       Yellow putty  Rolling, shaping,  (tried mallet which was painful) Yellow putty  Rolling, shaping,   Yellow putty  Rolling, shaping, Yellow putty  shaping Yellow putty  Shaping, mallet; pinching, pulling   Power web  Power web       Sponge  Yellow digiflex x 25  Power  with sponge  Power  #2 2x10 Power  #2 2x10    Red flex bar  Red flex bar  Wrist flex/ext x25  UE bike #2 6                              kinesio tape at wrist kinesio tape at wrist                            Manual Therapy (16294)   STM,, PROM STM,, PROM STM,, PROM   (IASTM, Dry Needling, manual mobilization)                Splinting        Lcode:        Orthotic Mgmt, Subsequent Enc (60821)        Orthotic Mgmt & Training (10024)                Other:                  Therapeutic Exercise & NMR:  [] (37474) Provided verbal/tactile cueing for activities related to strengthening, flexibility, endurance, ROM  for improvements in scapular, scapulothoracic and UE control with self care, reaching, carrying, lifting, house/yardwork, driving/computer work. [x] (59156) Provided verbal/tactile cueing for activities related to improving balance, coordination, kinesthetic sense, posture, motor skill, proprioception  to assist with  scapular, scapulothoracic and UE control with self care, reaching, carrying, lifting, house/yardwork, driving/computer work.     Therapeutic Activities & NMR:    [] (44172 or 01138) Provided verbal/tactile cueing for activities related to improving balance, coordination, kinesthetic sense, posture, motor skill, proprioception and motor activation to allow for proper function of scapular, scapulothoracic and UE control with self care, carrying, lifting, driving/computer work    Home Exercise Program:    [] (82012) Reviewed/Progressed HEP activities related to strengthening, flexibility, endurance, ROM of scapular, scapulothoracic and UE control with self care, reaching, carrying, lifting, house/yardwork, driving/computer work  [] (58809) Reviewed/Progressed HEP activities related to improving balance, coordination, kinesthetic sense, posture, motor skill, proprioception of scapular, scapulothoracic and UE control with self care, reaching, carrying, lifting, house/yardwork, driving/computer work      Manual Treatments:  PROM / STM / Oscillations-Mobs:  G-I, II, III, IV (PA's, Inf., Post.)  [] (50070) Provided manual therapy to mobilize soft tissue/joints of cervical/CT, scapular GHJ and UE for the purpose of modulating pain, promoting relaxation,  increasing ROM, reducing/eliminating soft tissue swelling/inflammation/restriction, improving soft tissue extensibility and allowing for proper ROM for normal function with self care, reaching, carrying, lifting, house/yardwork, driving/computer work    ADL Training:  [] (66891) Provided self-care/home management training related to activities of daily living and compensatory training, and/or use of adaptive equipment      Charges:  Timed Code Treatment Minutes: 45'   Total Treatment Minutes: 54'''   Worker's Comp: Time In/Time Out     [] EVAL (LOW) 99115 (typically 20 minutes face-to-face)    [] EVAL (MOD) 23343 (typically 30 minutes face-to-face)  [] EVAL (HIGH) 27744 (typically 45 minutes face-to-face)  [] OT Re-eval (05453)       [x] Adrien (Y0853521) x 1     [] PXMZJ(80051)  [] NMR (25614) x  1    [] Estim (attended) (75080)   [x] Manual ([id number]) x   1   [x] US (12807)  [] TA (70184) x      [] Paraffin (00474)  [] ADL  (22553) x     [] Splint/L code:    [] Estim (unattended) (55991)  [x] Fluidotherapy (54735)  [] Other:    Comorbidities Affecting Functional Performance:     []Anxiety (F41.9)/Depression (F32.9)   []Diabetes Type 1(E10.65) or 2 (E11.65)   []Rheumatoid Arthritis (M05.9)  []Fibromyalgia (M79.7)  []Neuropathy(G60.9)  []Osteoarthritis(M19.91)  []None   []Other:    ASSESSMENT:  Pt has been making progress in all areas however has not met any long term goal areas yet. GOALS:  Patient stated goal: to close hand and use as before    []? Progressing: []? Met: []? Not Met: []? Adjusted     Therapist goals for Patient:   Short Term Goals: To be achieved in: 2 weeks  1. Independent in HEP and progression per patient tolerance, in order to prevent re-injury. []? Progressing: [x]? Met: []?  Not Met: []? Adjusted   2. Patient will have a decrease in pain to facilitate improvement in movement, function, and ADLs as indicated by Functional Deficits. []? Progressing: [x]? Met: []? Not Met: []? Adjusted     Long Term Goals to be achieved in 6 weeks (through 3/18/22), including patient directed goals to address patient identified performance deficits:  1) Pt to be independent in graded HEP progression with a good level of effort and compliance. [x]? Progressing: []? Met: []? Not Met: []? Adjusted   2) Pt to report a score of </= 25 % on the Quick DASH disability questionnaire for increased performance with carrying, moving, and handling objects. [x]? Progressing: []? Met: []? Not Met: []? Adjusted   3) Pt will demonstrate increased ROM to [facility] for improved independence with self care, home mgt tasks, driving, vocational tasks and sleeping. [x]? Progressing: []? Met: []? Not Met: []? Adjusted   4) Pt will demonstrate increased  strength to at least 50% of uninjured hand for improved independence with self care, home mgt tasks, driving, and vocational tasks  [x]? Progressing: []? Met: []? Not Met: []? Adjusted   5) Pt will have a decrease in pain to 2/10 to facilitate independence with self care, home mgt tasks, driving, vocational tasks and sleeping. [x]? Progressing: []? Met: []? Not Met: []? Adjusted      Overall Progression Towards Functional Goals/Treatment Progress Update:  [] Patient is progressing as expected towards functional goals listed. [] Progression is slowed due to complexities/impairments listed. [] Progression has been slowed due to co-morbidities.   [x] Plan just implemented, too soon to assess goals progression <30 days  [] Goals require adjustment due to lack of progress  [] Patient is not progressing as expected and requires additional follow up with physician  [] All goals are met  [] Other:     Prognosis for POC: [x] Good [] Fair  [] Poor    Patient requires continued skilled intervention: [x] Yes  [] No    Treatment/Activity Tolerance:  [x] Patient able to complete treatment  [] Patient limited by fatigue  [] Patient limited by pain    [] Patient limited by other medical complications  [] Other:                  PLAN: See eval  [] Continue per plan of care [] Alter current plan (see comments above)  [x] Plan of care initiated [] Hold pending MD visit [] Discharge      Electronically signed by:  Sebastián Brooks OTR/L [id number]    Note: If patient does not return for scheduled/ recommended follow up visits, this note will serve as a discharge from care along with most recent update on progress.   Phone: [phone]  Fax [phone]

## 2022-04-05 ENCOUNTER — OFFICE VISIT (OUTPATIENT)
Dept: ORTHOPEDIC SURGERY | Age: 67
End: 2022-04-05
Payer: COMMERCIAL

## 2022-04-05 VITALS — WEIGHT: 158 LBS | BODY MASS INDEX: 31.85 KG/M2 | HEIGHT: 59 IN

## 2022-04-05 DIAGNOSIS — S52.532D CLOSED COLLES' FRACTURE OF LEFT RADIUS WITH ROUTINE HEALING, SUBSEQUENT ENCOUNTER: Primary | ICD-10-CM

## 2022-04-05 PROCEDURE — 99213 OFFICE O/P EST LOW 20 MIN: CPT | Performed by: ORTHOPAEDIC SURGERY

## 2022-04-05 NOTE — PROGRESS NOTES
ORTHOPAEDIC SURGERY FOLLOWUP VISIT     CHIEF COMPLAINT:  Left wrist injury     DATE OF INJURY: 12/12/2021  DIAGNOSIS: Left distal radius fracture  DATE OF SURGERY: N/A     HISTORY OF PRESENT ILLNESS:  82-year-old right-hand-dominant female presents for repeat evaluation of left wrist fracture.  It has now been nearly 4 months since her injury.  Her x-rays demonstrated a fracture of the distal radius, extra-articular with slight dorsal angulation and minor shortening.  She was noted to have significant ulnar positive variance with ulnar impaction syndrome pre-existing to her fracture.  CT scan was obtained to evaluate the anatomy and degree of angulation/shortening.  She opted for nonoperative treatment.  It has been nearly 4 months since her injury. She states that her pain is a 1 out of 10 on a day-to-day basis, but can be severe with certain movements. She feels limited in her activity as a result. Louie Tesfaye does feel as though it is improved from the last visit, but not where she wants to be. . Her pain is primarily over the ulnar aspect of the wrist and does not radiate.  It is worse with attempts at rotational movements of the forearm. She feels some restriction to motion. She indicates she made some minor progress with OT, but still feels as though she is unable to work. She is unable to pull up her pants with any significant force. She indicates she is very disappointed with her result.     PHYSICAL EXAM:  General: Well-appearing female stated age.  No acute distress.   Left upper extremity: There are no cuts, open wounds, or abrasions.  There is no significant ecchymosis about the wrist.  There is mild swelling primarily about the dorsal hand and distal radius.  This continues to improve from prior eval.  Wrist range of motion demonstrates soreness with any movement.  She has 50 degrees supination, 90 degrees pronation.  This is within the limits of pain.  Tenderness about the DRUJ is minimal.. There is 60 degrees wrist flexion, 40 degrees extension.  She has minimal movement to radial/ulnar deviation.  She has pain-free movement of the elbow. Sensation is intact to light touch in median, radial, ulnar, and axillary distributions.  Motor function is intact to AIN, PIN, ulnar motor nerves.  There is a strong palpable radial pulse, and brisk capillary refill to the fingers.  Compartments are soft and compressible     RADIOGRAPHIC EXAM:  No new x-rays obtained today.     ASSESSMENT AND PLAN:  1.  Left distal radius fracture  2.  Left wrist ulnar impaction syndrome  3.  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 I reviewed the situation including chronic deformity. At this point, she continues to struggle primarily with ulnar-sided wrist pain as well as weakness. I recommended that she get an additional opinion. The patient will be referred to Dr. Eliza Castano for his expertise in upper extremity surgery.   She was provided a work note indicating that she will remain off until her consultation.  Karen Frias MD

## 2022-10-10 ENCOUNTER — HOSPITAL ENCOUNTER (OUTPATIENT)
Dept: MAMMOGRAPHY | Age: 67
Discharge: HOME | End: 2022-10-10
Payer: COMMERCIAL

## 2022-10-10 DIAGNOSIS — Z12.31 BREAST CANCER SCREENING BY MAMMOGRAM: ICD-10-CM

## 2022-10-10 PROCEDURE — 77063 BREAST TOMOSYNTHESIS BI: CPT

## 2023-10-20 ENCOUNTER — HOSPITAL ENCOUNTER (OUTPATIENT)
Dept: MAMMOGRAPHY | Age: 68
Discharge: HOME | End: 2023-10-20
Payer: MEDICARE

## 2023-10-20 VITALS — BODY MASS INDEX: 32.25 KG/M2 | WEIGHT: 160 LBS | HEIGHT: 59 IN

## 2023-10-20 DIAGNOSIS — Z12.31 BREAST CANCER SCREENING BY MAMMOGRAM: ICD-10-CM

## 2023-10-20 PROCEDURE — 77063 BREAST TOMOSYNTHESIS BI: CPT

## 2023-12-21 ENCOUNTER — TELEPHONE (OUTPATIENT)
Dept: ORTHOPEDIC SURGERY | Age: 68
End: 2023-12-21

## 2023-12-24 PROBLEM — S52.501A CLOSED FRACTURE OF RIGHT DISTAL RADIUS: Status: ACTIVE | Noted: 2023-12-24

## 2023-12-27 NOTE — TELEPHONE ENCOUNTER
Prescription Refill     Medication Name:  HYDROCODONE  Pharmacy: [facility]/pharmacy [address] [place] [address] , [facility] [address]  Patient Contact Number:  [phone]    DR TURPIN SENT MEDICATION ON 12/23/23 PATIENT STATES THAT PHARMACY HAS NOT RECEIVED MEDICATION.  PLS CALL TO ADVISE

## 2023-12-27 NOTE — TELEPHONE ENCOUNTER
[facility] pharmacist stated the RX was placed on hold. Pharmacy is able to fill today.

## 2024-01-30 ENCOUNTER — OFFICE VISIT (OUTPATIENT)
Dept: ORTHOPEDIC SURGERY | Age: 69
End: 2024-01-30
Payer: MEDICARE

## 2024-01-30 DIAGNOSIS — M65.331 TRIGGER MIDDLE FINGER OF RIGHT HAND: Primary | ICD-10-CM

## 2024-01-30 DIAGNOSIS — S52.532D CLOSED COLLES' FRACTURE OF LEFT RADIUS WITH ROUTINE HEALING, SUBSEQUENT ENCOUNTER: ICD-10-CM

## 2024-01-30 PROCEDURE — 1123F ACP DISCUSS/DSCN MKR DOCD: CPT | Performed by: ORTHOPAEDIC SURGERY

## 2024-01-30 PROCEDURE — 99213 OFFICE O/P EST LOW 20 MIN: CPT | Performed by: ORTHOPAEDIC SURGERY

## 2024-01-30 PROCEDURE — G8417 CALC BMI ABV UP PARAM F/U: HCPCS | Performed by: ORTHOPAEDIC SURGERY

## 2024-01-30 PROCEDURE — G8400 PT W/DXA NO RESULTS DOC: HCPCS | Performed by: ORTHOPAEDIC SURGERY

## 2024-01-30 PROCEDURE — G8484 FLU IMMUNIZE NO ADMIN: HCPCS | Performed by: ORTHOPAEDIC SURGERY

## 2024-01-30 PROCEDURE — 1090F PRES/ABSN URINE INCON ASSESS: CPT | Performed by: ORTHOPAEDIC SURGERY

## 2024-01-30 PROCEDURE — G8427 DOCREV CUR MEDS BY ELIG CLIN: HCPCS | Performed by: ORTHOPAEDIC SURGERY

## 2024-01-30 PROCEDURE — 1036F TOBACCO NON-USER: CPT | Performed by: ORTHOPAEDIC SURGERY

## 2024-01-30 PROCEDURE — 3017F COLORECTAL CA SCREEN DOC REV: CPT | Performed by: ORTHOPAEDIC SURGERY

## 2024-02-01 ENCOUNTER — HOSPITAL ENCOUNTER (OUTPATIENT)
Dept: OCCUPATIONAL THERAPY | Age: 69
Setting detail: THERAPIES SERIES
Discharge: HOME | End: 2024-02-01
Payer: MEDICARE

## 2024-02-01 PROCEDURE — 97110 THERAPEUTIC EXERCISES: CPT

## 2024-02-01 PROCEDURE — 97166 OT EVAL MOD COMPLEX 45 MIN: CPT

## 2024-02-01 NOTE — FLOWSHEET NOTE
[facility]- Outpatient Rehabilitation and Therapy  [place] [address] office: [phone] fax: [phone]      Occupational Therapy: Treatment/Progress Note   Patient: Lucy Munson (68 y.o. female)   Treatment Date: 2024   :  1955 MRN: [id number]   Visit #:   Insurance Allowable: BMN  Auth Needed: No   Insurance: Payor: MEDICARE / Plan: MEDICARE PART A AND B / Product Type: *No Product type* /   Insurance ID: [id number] - (Medicare)  Secondary Insurance (if applicable): AETNA   Treatment Diagnosis:   M25.60 (ICD-10-CM) - Stiffness of joint, not elsewhere classified, involving unspecified sit, R53.1 (ICD-10-CM) - Weakness, and Z74.1 (ICD-10-CM) - Need for assistance with personal care   Medical Diagnosis:    Closed Colles' fracture of left radius with routine healing, subsequent encounter [S54.532Q]   Referring Provider: Deandre Gonzalez MD  PCP: Tato Pineda DO     Plan of care signed: No    Date of Patient follow up with Physician:  3/19/24    Progress Report/POC: EVAL today  POC update due: (OR 10 visits /OR AUTH LIMITS, whichever is less) -  24  Recertification due: 3/28/24                            Precautions/ Contra-indications:   Latex allergy:   No  Pacemaker:     No  Other: NA    Red Flags:  None    C-SSRS Triggered by Intake questionnaire:   [x] No, Questionnaire did not trigger screening.   [] Yes, Patient intake triggered further evaluation      [] C-SSRS Screening completed  [] PCP notified via Plan of Care  [] Emergency services notified     Preferred Language for Healthcare: English    SUBJECTIVE EXAMINATION     Patient Report/Comments: see eval    Pain Ratin/10    OBJECTIVE EXAMINATION     Observation: see eval    Test measurements:      Test used Initial Assessment  2024 Progress Note   Pain Summary VAS 0          Functional questionnaire UEFI 33.75/100          Functional outcome measures [place] Peg Test

## 2024-02-01 NOTE — PLAN OF CARE
Depression (F32.9)   [] Bipolar        Prior surgeries  [] Involved limb  [] Previous spinal surgery  []  section birth  [] Hysterectomy  [] Bowel / bladder surgery          Other conditions  [] Vertigo  [] Syncope  [] Kidney Failure  [] Cancer  [] Pregnancy  [] Incontinence   Other Co-morbidities not listed: Not Applicable    SUBJECTIVE EXAMINATION     Patient stated complaint: The client presents with a healing R wrist fracture she sustained in a fall.  She reports there is a length discrepancy between the bones in her forearm.  She had the same injury in her L wrist 2 years ago and ended up needing surgery because of the same discrepancy in that arm.  She states her doctor wants her to participate in therapy to determine if she will need the surgery on her R wrist as well.   Functionally, pt stated difficulty with AROM, especially supination.  She has difficulty straightening her hair, crocheting and cooking at this time.    Pain:  Patient Scale: VAS: 0/10  Pain location: R wrist      Functional Outcome Measure:    Date Assessed 2024   Measure Used UEFI   Disability Score (rawscore/%) 27/33.75      Social support/environment:  Lives with: spouse     Family/caregiver support needed prior to current illness: No    If yes, required assistance with: NA    Home Environment:  1-story   Number of TENZIN: 1 w/ None ascending and ramp  Number of steps to 2nd floor: NA  Number of steps to basement: NA    Bathroom Accessibility: Accessible    Bathroom Environment: Walk-in shower and Tub/shower combo    Other Equipment:  None    Occupations:  Work/School Status: Retired  Job Duties/Demands: NA  Active : Yes    Sport/ Recreation/ Leisure/ Hobbies: madyson, walking, taking care of dogs    Hand Dominance: right    OBJECTIVE EXAMINATION     Observations:  Posture: WNL  Bandages/Dressings/Incisions: no  Edema: yes, min edema in R hand    ADL and IADL Status:  Current ADL Status: Requires assist for jacob buttons

## 2024-02-06 ENCOUNTER — HOSPITAL ENCOUNTER (OUTPATIENT)
Dept: OCCUPATIONAL THERAPY | Age: 69
Setting detail: THERAPIES SERIES
Discharge: HOME | End: 2024-02-06
Payer: MEDICARE

## 2024-02-06 PROCEDURE — 97110 THERAPEUTIC EXERCISES: CPT

## 2024-02-06 PROCEDURE — 97530 THERAPEUTIC ACTIVITIES: CPT

## 2024-02-07 PROBLEM — S52.532A FRACTURE, COLLES, LEFT, CLOSED: Status: ACTIVE | Noted: 2024-02-07

## 2024-02-07 PROBLEM — M65.331 TRIGGER MIDDLE FINGER OF RIGHT HAND: Status: ACTIVE | Noted: 2024-02-07

## 2024-02-07 NOTE — PROGRESS NOTES
CHIEF COMPLAINT:   1- Right wrist pain/ minimally displaced distal radius fracture.  2- Trigger middle finger of right hand.    DATE OF INJURY: 2023     HISTORY:  Ms. Munson is a 68 y.o.  female right handed who presents today for f/u evaluation of a right wrist injury.  The patient reports that this injury occurred when she fell in the park.  She was first seen and evaluated in [facility], when she was x-rayed and splinted, and asked to f/u with Orthopedics. The patient denies any other injuries. Rates pain a 4/10 VAS mild, achy, and improving.  Movement makes the pain worse, the splint and resting makes the pain better. Alleviating factors rest. No numbness or tingling sensation. She is c/o Trigger middle finger of right hand.    Past Medical History:   Diagnosis Date    Osteoporosis     Thyroid disease        Past Surgical History:   Procedure Laterality Date    BREAST REDUCTION SURGERY      BREAST SURGERY      CHOLECYSTECTOMY         Social History     Socioeconomic History    Marital status:      Spouse name: Not on file    Number of children: Not on file    Years of education: Not on file    Highest education level: Not on file   Occupational History    Not on file   Tobacco Use    Smoking status: Former     Current packs/day: 0.00     Average packs/day: 1 pack/day for 23.0 years (23.0 ttl pk-yrs)     Types: Cigarettes     Start date: 10/23/1980     Quit date: 10/23/2003     Years since quittin.3    Smokeless tobacco: Never   Substance and Sexual Activity    Alcohol use: No    Drug use: No    Sexual activity: Not on file   Other Topics Concern    Not on file   Social History Narrative    Not on file     Social Determinants of Health     Financial Resource Strain: Not on file   Food Insecurity: Not on file   Transportation Needs: Not on file   Physical Activity: Sufficiently Active (2023)    Exercise Vital Sign     Days of Exercise per Week: 5 days     Minutes of Exercise per Session:

## 2024-02-08 ENCOUNTER — HOSPITAL ENCOUNTER (OUTPATIENT)
Dept: OCCUPATIONAL THERAPY | Age: 69
Setting detail: THERAPIES SERIES
Discharge: HOME | End: 2024-02-08
Payer: MEDICARE

## 2024-02-08 PROCEDURE — 97530 THERAPEUTIC ACTIVITIES: CPT

## 2024-02-08 NOTE — FLOWSHEET NOTE
[facility]- Outpatient Rehabilitation and Therapy  [place] [address] office: [phone] fax: [phone]      Occupational Therapy: Treatment/Progress Note   Patient: Lucy Munson (68 y.o. female)   Treatment Date: 2024   :  1955 MRN: [id number]   Visit #: 3/12  Insurance Allowable: BMN  Auth Needed: No   Insurance: Payor: MEDICARE / Plan: MEDICARE PART A AND B / Product Type: *No Product type* /   Insurance ID: [id number] - (Medicare)  Secondary Insurance (if applicable): AETNA   Treatment Diagnosis:   M25.60 (ICD-10-CM) - Stiffness of joint, not elsewhere classified, involving unspecified sit, R53.1 (ICD-10-CM) - Weakness, and Z74.1 (ICD-10-CM) - Need for assistance with personal care   Medical Diagnosis:    Closed Colles' fracture of left radius with routine healing, subsequent encounter [S5.532H]   Referring Provider: Deandre Gonzalez MD  PCP: Tato Pineda DO     Plan of care signed: No    Date of Patient follow up with Physician:  3/19/24    Progress Report/POC: NO  POC update due: (OR 10 visits /OR AUTH LIMITS, whichever is less) -  24  Recertification due: 3/28/24                            Precautions/ Contra-indications:   Latex allergy:   No  Pacemaker:     No  Other: NA    Red Flags:  None    C-SSRS Triggered by Intake questionnaire:   [x] No, Questionnaire did not trigger screening.   [] Yes, Patient intake triggered further evaluation      [] C-SSRS Screening completed  [] PCP notified via Plan of Care  [] Emergency services notified     Preferred Language for Healthcare: English    SUBJECTIVE EXAMINATION     Patient Report/Comments: Reports her third finger was stuck this morning when she woke up.  Pt reports discomfort in R medial wrist with 3 pt pinch during placement of clothespins on vertical bar.    Pain Ratin/10 R medial wrist where her ulna is elongated.    OBJECTIVE EXAMINATION     Observation: see eval    Test measurements:

## 2024-02-13 ENCOUNTER — HOSPITAL ENCOUNTER (OUTPATIENT)
Dept: OCCUPATIONAL THERAPY | Age: 69
Setting detail: THERAPIES SERIES
Discharge: HOME | End: 2024-02-13
Payer: MEDICARE

## 2024-02-13 PROCEDURE — 97110 THERAPEUTIC EXERCISES: CPT

## 2024-02-13 PROCEDURE — 97530 THERAPEUTIC ACTIVITIES: CPT

## 2024-02-13 NOTE — FLOWSHEET NOTE
[facility]- Outpatient Rehabilitation and Therapy  [facility] [place] [address] office: [phone] fax: [phone]      Occupational Therapy: Treatment/Progress Note   Patient: Lucy Munson (68 y.o. female)   Treatment Date: 2024   :  1955 MRN: [id number]   Visit #:   Insurance Allowable: BMN  Auth Needed: No   Insurance: Payor: MEDICARE / Plan: MEDICARE PART A AND B / Product Type: *No Product type* /   Insurance ID: [id number] - (Medicare)  Secondary Insurance (if applicable): AETNA   Treatment Diagnosis:   M25.60 (ICD-10-CM) - Stiffness of joint, not elsewhere classified, involving unspecified sit, R53.1 (ICD-10-CM) - Weakness, and Z74.1 (ICD-10-CM) - Need for assistance with personal care   Medical Diagnosis:    Closed Colles' fracture of left radius with routine healing, subsequent encounter [S57.532V]   Referring Provider: Deandre Gonzalez MD  PCP: Tato Pineda DO     Plan of care signed: No    Date of Patient follow up with Physician:  3/19/24    Progress Report/POC: NO  POC update due: (OR 10 visits /OR AUTH LIMITS, whichever is less) -  24  Recertification due: 3/28/24                            Precautions/ Contra-indications:   Latex allergy:   No  Pacemaker:     No  Other: NA    Red Flags:  None    C-SSRS Triggered by Intake questionnaire:   [x] No, Questionnaire did not trigger screening.   [] Yes, Patient intake triggered further evaluation      [] C-SSRS Screening completed  [] PCP notified via Plan of Care  [] Emergency services notified     Preferred Language for Healthcare: English    SUBJECTIVE EXAMINATION     Patient Report/Comments: Pt reports she peeled an apple the other day without difficulty.  She has been cooking without difficulty, except peeling potatoes.    Pain Ratin/10 R wrist with certain movement    OBJECTIVE EXAMINATION     Observation: see eval    Test measurements:     Right Upper Extremity Left Upper Extremity     PROM

## 2024-02-15 ENCOUNTER — HOSPITAL ENCOUNTER (OUTPATIENT)
Dept: OCCUPATIONAL THERAPY | Age: 69
Setting detail: THERAPIES SERIES
Discharge: HOME | End: 2024-02-15
Payer: MEDICARE

## 2024-02-15 PROCEDURE — 97110 THERAPEUTIC EXERCISES: CPT

## 2024-02-15 PROCEDURE — 97530 THERAPEUTIC ACTIVITIES: CPT

## 2024-02-15 NOTE — FLOWSHEET NOTE
SHOULDER Flexion   (0-180)   170        170        Extension (0-45)                     Abduction -C5 (0-80)                     ER (0-90)                        IR (0-70)                                                 ELBOW Flexion - C6  (0-145)                     Extension - C7 (145-0)                     Pronation (0-90)                     Supination (0-90)   15 82                                            WRIST Flexion - C7   (0-80)   15 35       45        Extension - C6 (0-70)   30 55       85        Radial Deviation (0-20)   10        10        Ulnar Deviation (0-45)   30        60                                                   Test used Initial Assessment  02/01/2024 Progress Note    2/13/24   Pain Summary VAS 0          Functional questionnaire UEFI 33.75/100          Functional outcome measures Nine Hole Peg Test (seconds)  Seated  Bilateral   R: 21  L: 19     WRMT  (seconds) R: 22.5  L: 21      Strength (#)  Bilateral R: 2  L: 33 R:12  L: 36    Lateral Pinch (#) R: 5.5     3 pt. Pinch (#) R: 5          Other measures Handwriting  TBA 2/6/24  Rated 8/10 best signature     Typing Test  Bilateral 2/8/24  7wpm                  Norms (50th percentile) for pt's age group:  BOLD means deficient             R/L  : 48/43  Lateral pinch: 11/10  Three point pinch:  10.5/9.5  NHPT: 22.5/25    Exercises/Interventions:     Manual Intervention (67512) Total Time:            Therapeutic Exercise (19537)  Resistance Sets/Time Reps Notes/Cues/Progressions          : Digi-flex  3#  2 10    Theraputty: (Orange)  Squeeze and pull  Key Pinch with Putty   3-Point Pinch with Putty   Remove 10 beans from putty       Theraband progressive hand   3-pt pinch green 3 10    AROM:   Seated Forearm Pronation and Supination   Wrist  Flexion Extension   Seated Finger Composite Flexion Extension   Finger PIP Flexion Extension with Blocking    Seated Finger DIP Flexion AROM with

## 2024-02-20 ENCOUNTER — HOSPITAL ENCOUNTER (OUTPATIENT)
Dept: OCCUPATIONAL THERAPY | Age: 69
Setting detail: THERAPIES SERIES
Discharge: HOME | End: 2024-02-20
Payer: MEDICARE

## 2024-02-20 PROCEDURE — 97535 SELF CARE MNGMENT TRAINING: CPT

## 2024-02-20 PROCEDURE — 97530 THERAPEUTIC ACTIVITIES: CPT

## 2024-02-20 NOTE — FLOWSHEET NOTE
15# to facilitate increased independence with manipulating objects for increased participation and independence ADLs and IADLs.  [] Progressing: [x] Met:24 [] Not Met: [] Adjusted  5.Pt will rate satisfaction with signature as 7/10.   [] Progressing: [x] Met:24 [] Not Met: [] Adjusted     Long Term Goals: To be achieved in: 6 weeks  1. Pt will improve score on UEFS to 55/100 for a subjective report of decreased difficulty with completing every day activities with RUE  [] Progressing: [x] Met: 24 [] Not Met: [] Adjusted  2. Pt will report successful completion of moderate meal prep with mod I.   [] Progressing: [x] Met: 24 [] Not Met: [] Adjusted  3. Pt will demonstrate active supination AROM to 65 degrees or better, against gravity, while rating pain < 2/10 for improved ROM for functional task performance.  [] Progressing: [x] Met: 24 [] Not Met: [] Adjusted  4. Pt will improve right hand  strength to 25# to facilitate increased independence with manipulating objects for increased participation and independence ADLs and IADLs.  [x] Progressin24 [] Met: [] Not Met: [] Adjusted  5. Pt will be Independent with HEP/Home activities program to facilitate independence with carryover from sessions and to progress towards returning to PLOF  [] Progressing: [x] Met:24 [] Not Met: [] Adjusted  6. Pt will Increase on right lateral pinch to 12 lbs to improve performance with fine motor tasks such as turning keys and carrying plates.   [x] Progressin24[] Met: [] Not Met: [] Adjusted  7. Pt will demonstrate active wrist AROM to flexion 40 degrees or better, against gravity, while rating pain < 2/10 for improved ROM for functional task performance.  [] Progressing: [x] Met:24 [] Not Met: [] Adjusted          Overall Progression Towards Functional goals/ Treatment Progress Update:  Other: Good progress has been made.  Discharge today.      CHARGE CAPTURE     CHARGE GRID   CPT

## 2024-02-27 ENCOUNTER — APPOINTMENT (OUTPATIENT)
Dept: OCCUPATIONAL THERAPY | Age: 69
End: 2024-02-27
Payer: MEDICARE

## 2024-02-29 ENCOUNTER — APPOINTMENT (OUTPATIENT)
Dept: OCCUPATIONAL THERAPY | Age: 69
End: 2024-02-29
Payer: MEDICARE

## 2024-03-26 ENCOUNTER — OFFICE VISIT (OUTPATIENT)
Dept: ORTHOPEDIC SURGERY | Age: 69
End: 2024-03-26
Payer: MEDICARE

## 2024-03-26 VITALS — BODY MASS INDEX: 31.85 KG/M2 | WEIGHT: 158 LBS | HEIGHT: 59 IN

## 2024-03-26 DIAGNOSIS — S52.571A OTHER CLOSED INTRA-ARTICULAR FRACTURE OF DISTAL END OF RIGHT RADIUS, INITIAL ENCOUNTER: Primary | ICD-10-CM

## 2024-03-26 PROCEDURE — G8427 DOCREV CUR MEDS BY ELIG CLIN: HCPCS | Performed by: NURSE PRACTITIONER

## 2024-03-26 PROCEDURE — 99213 OFFICE O/P EST LOW 20 MIN: CPT | Performed by: NURSE PRACTITIONER

## 2024-03-26 PROCEDURE — G8400 PT W/DXA NO RESULTS DOC: HCPCS | Performed by: NURSE PRACTITIONER

## 2024-03-26 PROCEDURE — 1123F ACP DISCUSS/DSCN MKR DOCD: CPT | Performed by: NURSE PRACTITIONER

## 2024-03-26 PROCEDURE — 3017F COLORECTAL CA SCREEN DOC REV: CPT | Performed by: NURSE PRACTITIONER

## 2024-03-26 PROCEDURE — G8417 CALC BMI ABV UP PARAM F/U: HCPCS | Performed by: NURSE PRACTITIONER

## 2024-03-26 PROCEDURE — G8484 FLU IMMUNIZE NO ADMIN: HCPCS | Performed by: NURSE PRACTITIONER

## 2024-03-26 PROCEDURE — 1090F PRES/ABSN URINE INCON ASSESS: CPT | Performed by: NURSE PRACTITIONER

## 2024-03-26 PROCEDURE — 1036F TOBACCO NON-USER: CPT | Performed by: NURSE PRACTITIONER

## 2024-03-26 NOTE — PROGRESS NOTES
middle finger of right hand.    PLAN: She can go back to normal activity with no restrictions.  She was instructed to continue to work on range of motion and strengthening exercises and she would like to do this on her own.. She will be seen PRN. I told the patient that it is not unusual to have some achy pain and swelling for up to a year after a fracture.  She has a planned right middle finger trigger finger release and a right ulnar shortening by Dr. Springer.      As this patient has demonstrated risk factors for osteoporosis, such as age greater than fifty years and evidence of a fracture, I have referred the patient back to the primary care physician for evaluation for osteoporosis, including consideration for DEXA scanning, if this is felt to be clinically indicated.  The patient is advised to contact the primary care physician to follow-up for further evaluation.        Subha Rowan, PHILIP - CNP

## 2024-10-22 ENCOUNTER — HOSPITAL ENCOUNTER (OUTPATIENT)
Dept: MAMMOGRAPHY | Age: 69
Discharge: HOME | End: 2024-10-22
Payer: MEDICARE

## 2024-10-22 VITALS — WEIGHT: 158 LBS | HEIGHT: 60 IN | BODY MASS INDEX: 31.02 KG/M2

## 2024-10-22 DIAGNOSIS — Z12.31 ENCOUNTER FOR SCREENING MAMMOGRAM FOR BREAST CANCER: ICD-10-CM

## 2024-10-22 PROCEDURE — 77063 BREAST TOMOSYNTHESIS BI: CPT

## 2025-08-14 ENCOUNTER — HOSPITAL ENCOUNTER (EMERGENCY)
Age: 70
Discharge: HOME | End: 2025-08-14
Attending: EMERGENCY MEDICINE
Payer: MEDICARE

## 2025-08-14 ENCOUNTER — APPOINTMENT (OUTPATIENT)
Dept: GENERAL RADIOLOGY | Age: 70
End: 2025-08-14
Payer: MEDICARE

## 2025-08-14 ENCOUNTER — APPOINTMENT (OUTPATIENT)
Dept: CT IMAGING | Age: 70
End: 2025-08-14
Payer: MEDICARE

## 2025-08-14 VITALS
BODY MASS INDEX: 32.67 KG/M2 | DIASTOLIC BLOOD PRESSURE: 87 MMHG | OXYGEN SATURATION: 100 % | TEMPERATURE: 98.4 F | HEIGHT: 60 IN | RESPIRATION RATE: 16 BRPM | WEIGHT: 166.4 LBS | HEART RATE: 84 BPM | SYSTOLIC BLOOD PRESSURE: 148 MMHG

## 2025-08-14 DIAGNOSIS — R51.9 UNILATERAL HEADACHE: ICD-10-CM

## 2025-08-14 DIAGNOSIS — R06.02 SHORTNESS OF BREATH: ICD-10-CM

## 2025-08-14 DIAGNOSIS — R51.9 FACIAL PAIN: Primary | ICD-10-CM

## 2025-08-14 LAB
ALBUMIN SERPL-MCNC: 4 G/DL (ref 3.4–5)
ALBUMIN/GLOB SERPL: 1.6 {RATIO} (ref 1.1–2.2)
ALP SERPL-CCNC: 64 U/L (ref 40–129)
ALT SERPL-CCNC: 20 U/L (ref 10–40)
ANION GAP SERPL CALCULATED.3IONS-SCNC: 13 MMOL/L (ref 3–16)
AST SERPL-CCNC: 20 U/L (ref 15–37)
BASOPHILS # BLD: 0.1 K/UL (ref 0–0.2)
BASOPHILS NFR BLD: 1 %
BILIRUB SERPL-MCNC: 0.3 MG/DL (ref 0–1)
BUN SERPL-MCNC: 16 MG/DL (ref 7–20)
CALCIUM SERPL-MCNC: 9.5 MG/DL (ref 8.3–10.6)
CHLORIDE SERPL-SCNC: 104 MMOL/L (ref 99–110)
CO2 SERPL-SCNC: 22 MMOL/L (ref 21–32)
CREAT SERPL-MCNC: 0.8 MG/DL (ref 0.6–1.2)
D-DIMER QUANTITATIVE: <0.27 UG/ML FEU (ref 0–0.6)
DEPRECATED RDW RBC AUTO: 13.6 % (ref 12.4–15.4)
EOSINOPHIL # BLD: 0.3 K/UL (ref 0–0.6)
EOSINOPHIL NFR BLD: 4 %
GFR SERPLBLD CREATININE-BSD FMLA CKD-EPI: 80 ML/MIN/{1.73_M2}
GLUCOSE SERPL-MCNC: 111 MG/DL (ref 70–99)
HCT VFR BLD AUTO: 41.5 % (ref 36–48)
HGB BLD-MCNC: 14.2 G/DL (ref 12–16)
LYMPHOCYTES # BLD: 2.5 K/UL (ref 1–5.1)
LYMPHOCYTES NFR BLD: 30.7 %
MAGNESIUM SERPL-MCNC: 2.31 MG/DL (ref 1.8–2.4)
MCH RBC QN AUTO: 30.2 PG (ref 26–34)
MCHC RBC AUTO-ENTMCNC: 34.1 G/DL (ref 31–36)
MCV RBC AUTO: 88.5 FL (ref 80–100)
MONOCYTES # BLD: 0.6 K/UL (ref 0–1.3)
MONOCYTES NFR BLD: 7.2 %
NEUTROPHILS # BLD: 4.6 K/UL (ref 1.7–7.7)
NEUTROPHILS NFR BLD: 57.1 %
NT-PROBNP SERPL-MCNC: <36 PG/ML (ref 0–124)
PLATELET # BLD AUTO: 289 K/UL (ref 135–450)
PMV BLD AUTO: 7.4 FL (ref 5–10.5)
POTASSIUM SERPL-SCNC: 3.9 MMOL/L (ref 3.5–5.1)
PROT SERPL-MCNC: 6.5 G/DL (ref 6.4–8.2)
RBC # BLD AUTO: 4.69 M/UL (ref 4–5.2)
SODIUM SERPL-SCNC: 139 MMOL/L (ref 136–145)
TROPONIN, HIGH SENSITIVITY: 7 NG/L (ref 0–14)
WBC # BLD AUTO: 8 K/UL (ref 4–11)

## 2025-08-14 PROCEDURE — 96374 THER/PROPH/DIAG INJ IV PUSH: CPT

## 2025-08-14 PROCEDURE — 71046 X-RAY EXAM CHEST 2 VIEWS: CPT

## 2025-08-14 PROCEDURE — 83735 ASSAY OF MAGNESIUM: CPT

## 2025-08-14 PROCEDURE — 85025 COMPLETE CBC W/AUTO DIFF WBC: CPT

## 2025-08-14 PROCEDURE — 2580000003 HC RX 258: Performed by: EMERGENCY MEDICINE

## 2025-08-14 PROCEDURE — 85379 FIBRIN DEGRADATION QUANT: CPT

## 2025-08-14 PROCEDURE — 93005 ELECTROCARDIOGRAM TRACING: CPT | Performed by: EMERGENCY MEDICINE

## 2025-08-14 PROCEDURE — 36415 COLL VENOUS BLD VENIPUNCTURE: CPT

## 2025-08-14 PROCEDURE — 83880 ASSAY OF NATRIURETIC PEPTIDE: CPT

## 2025-08-14 PROCEDURE — 84484 ASSAY OF TROPONIN QUANT: CPT

## 2025-08-14 PROCEDURE — 99285 EMERGENCY DEPT VISIT HI MDM: CPT

## 2025-08-14 PROCEDURE — 6360000002 HC RX W HCPCS: Performed by: EMERGENCY MEDICINE

## 2025-08-14 PROCEDURE — 80053 COMPREHEN METABOLIC PANEL: CPT

## 2025-08-14 PROCEDURE — 70450 CT HEAD/BRAIN W/O DYE: CPT

## 2025-08-14 RX ORDER — METHYLPREDNISOLONE 4 MG/1
TABLET ORAL
Qty: 1 KIT | Refills: 0 | Status: SHIPPED | OUTPATIENT
Start: 2025-08-14 | End: 2025-08-20

## 2025-08-14 RX ORDER — METHOCARBAMOL 750 MG/1
750 TABLET, FILM COATED ORAL 4 TIMES DAILY
Qty: 40 TABLET | Refills: 0 | Status: SHIPPED | OUTPATIENT
Start: 2025-08-14 | End: 2025-08-24

## 2025-08-14 RX ORDER — 0.9 % SODIUM CHLORIDE 0.9 %
500 INTRAVENOUS SOLUTION INTRAVENOUS ONCE
Status: COMPLETED | OUTPATIENT
Start: 2025-08-14 | End: 2025-08-14

## 2025-08-14 RX ORDER — KETOROLAC TROMETHAMINE 15 MG/ML
15 INJECTION, SOLUTION INTRAMUSCULAR; INTRAVENOUS ONCE
Status: COMPLETED | OUTPATIENT
Start: 2025-08-14 | End: 2025-08-14

## 2025-08-14 RX ADMIN — KETOROLAC TROMETHAMINE 15 MG: 15 INJECTION, SOLUTION INTRAMUSCULAR; INTRAVENOUS at 18:26

## 2025-08-14 RX ADMIN — SODIUM CHLORIDE 500 ML: 0.9 INJECTION, SOLUTION INTRAVENOUS at 18:26

## 2025-08-14 ASSESSMENT — PAIN SCALES - GENERAL
PAINLEVEL_OUTOF10: 0
PAINLEVEL_OUTOF10: 4

## 2025-08-14 ASSESSMENT — PAIN DESCRIPTION - LOCATION: LOCATION: HEAD

## 2025-08-14 ASSESSMENT — PAIN - FUNCTIONAL ASSESSMENT: PAIN_FUNCTIONAL_ASSESSMENT: 0-10

## 2025-08-14 ASSESSMENT — LIFESTYLE VARIABLES
HOW OFTEN DO YOU HAVE A DRINK CONTAINING ALCOHOL: NEVER
HOW MANY STANDARD DRINKS CONTAINING ALCOHOL DO YOU HAVE ON A TYPICAL DAY: PATIENT DOES NOT DRINK

## 2025-08-15 LAB
EKG ATRIAL RATE: 89 BPM
EKG DIAGNOSIS: NORMAL
EKG P AXIS: 26 DEGREES
EKG P-R INTERVAL: 138 MS
EKG Q-T INTERVAL: 348 MS
EKG QRS DURATION: 72 MS
EKG QTC CALCULATION (BAZETT): 423 MS
EKG R AXIS: -6 DEGREES
EKG T AXIS: 7 DEGREES
EKG VENTRICULAR RATE: 89 BPM

## 2025-08-15 PROCEDURE — 93010 ELECTROCARDIOGRAM REPORT: CPT | Performed by: INTERNAL MEDICINE
